# Patient Record
Sex: FEMALE | Race: WHITE | NOT HISPANIC OR LATINO | Employment: OTHER | URBAN - METROPOLITAN AREA
[De-identification: names, ages, dates, MRNs, and addresses within clinical notes are randomized per-mention and may not be internally consistent; named-entity substitution may affect disease eponyms.]

---

## 2017-02-20 ENCOUNTER — OFFICE VISIT (OUTPATIENT)
Dept: NEUROLOGY | Facility: CLINIC | Age: 82
End: 2017-02-20

## 2017-02-20 VITALS — HEART RATE: 127 BPM | RESPIRATION RATE: 18 BRPM | DIASTOLIC BLOOD PRESSURE: 113 MMHG | SYSTOLIC BLOOD PRESSURE: 181 MMHG

## 2017-02-20 DIAGNOSIS — G20.A1 PARALYSIS AGITANS (H): ICD-10-CM

## 2017-02-20 RX ORDER — ROPINIROLE 0.25 MG/1
0.5 TABLET, FILM COATED ORAL 3 TIMES DAILY
Qty: 180 TABLET | Refills: 3 | Status: SHIPPED | OUTPATIENT
Start: 2017-02-20 | End: 2020-01-27

## 2017-02-20 ASSESSMENT — PAIN SCALES - GENERAL: PAINLEVEL: NO PAIN (0)

## 2017-02-20 NOTE — NURSING NOTE
Chief Complaint   Patient presents with     RECHECK     3 mo f/u pksons med check     Odessa Angulo,ADELAIDE

## 2017-02-20 NOTE — LETTER
2017       RE: Kiara Pacheco  [address]    [place] [address]     Dear Colleague,    Thank you for referring your patient, Kiara Pacheco, to the [facility]UR[facility] at [facility]. Please see a copy of my visit note below.    2017      Ron Joya MD   [facility]    [address] [address]      RE: Kiara Pacheco   MRN: [id number]   : 1934      Dear Colby:      I am writing to you in followup on Kiara Pacheco with chief complaint of Parkinson disease.  I last saw her 3 months ago.  She is here with her niece, rIma.  She is on ropinirole 0.25 mg 2 tablets twice a day.  She thinks it does help.  However, the patient has experienced a decline in her status.  She has more difficulty getting out of a chair and getting out of bed is also very difficult.  Therefore, she is pretty much sleeping in a chair.  She finds it acceptable, but Irma is worried about that.  The patient does take care of her morning cleaning up by herself.  A nurse comes in weekly to help her set up the medicines.  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      On exam, her blood pressure is 181/113.  She has obvious rest tremor in the arms.  She could get out of the chair without my assistance.  She walks 15 feet down the tovar in 20 seconds.  Her gait is extremely slow and she does use a walker.      ASSESSMENT:   1.  Parkinson disease.      DISCUSSION:  The patient is seen in follow-up with Parkinson disease.  There has been no major change in her neurologic status, but she has been experiencing a slow decline over the last year.  I am going to try bumping the ropinirole to 0.25 mg 2 tablets 3 times a day.  I will see her in followup in 3 months or sooner if there are problems.  Irma  knows to call.      Sincerely,       Guille Powers MD              D: 2017 16:28   T: 2017 08:03   MT: AKA      Name:     MINAL GARVIN   MRN:      [id number]        Account:      [id number]   :      1934           Service Date: 2017      Document: [id number]

## 2017-02-20 NOTE — MR AVS SNAPSHOT
After Visit Summary   2017    Kiara Pacheco    MRN: [id number]           Patient Information     Date Of Birth          3/8/1934        Visit Information        Provider Department      2017 4:00 PM Guille Powres MD [facility]        Today's Diagnoses     Paralysis agitans (H)           Follow-ups after your visit        Follow-up notes from your care team     Return in about 3 months (around 2017).      Your next 10 appointments already scheduled     May 22, 2017  4:00 PM CDT   Return Visit with Guille Powers MD   [facility] ([facility])    [address], [address]  [place] [address] [phone]              Who to contact     Please call your clinic at [phone] to:    Ask questions about your health    Make or cancel appointments    Discuss your medicines    Learn about your test results    Speak to your doctor   If you have compliments or concerns about an experience at your clinic, or if you wish to file a complaint, please contact [facility] Patient Relations at [phone] or email us at [email or web address]         Additional Information About Your Visit        MyChart Information     OneShiftt is an electronic gateway that provides easy, online access to your medical records. With Tubis, you can request a clinic appointment, read your test results, renew a prescription or communicate with your care team.     To sign up for OneShiftt visit the website at [email or web address]   You will be asked to enter the access code listed below, as well as some personal information. Please follow the directions to create your username and password.     Your access code is: [email or web address]  Expires: 2017  4:27 PM     Your access code will  in 90 days. If you need help or a new code, please contact  your [facility] or call [phone] for assistance.        Care EveryWhere ID     This is your Care EveryWhere ID. This could be used by other organizations to access your [place] medical records  [id number]        Your Vitals Were     Pulse Respirations                127 18           Blood Pressure from Last 3 Encounters:   02/20/17 (!) 181/113   11/21/16 186/88   08/15/16 140/90    Weight from Last 3 Encounters:   04/13/15 142 lb 3.2 oz (64.5 kg)   01/12/15 132 lb 4.4 oz (60 kg)              Today, you had the following     No orders found for display         Today's Medication Changes          These changes are accurate as of: 2/20/17  4:29 PM.  If you have any questions, ask your nurse or doctor.               These medicines have changed or have updated prescriptions.        Dose/Directions    rOPINIRole 0.25 MG tablet   Commonly known as:  REQUIP   This may have changed:    - how much to take  - when to take this   Used for:  Paralysis agitans (H)   Changed by:  Guille Powers MD        Dose:  0.5 mg   Take 2 tablets (0.5 mg) by mouth 3 times daily   Quantity:  180 tablet   Refills:  3            Where to get your medicines      These medications were sent to [facility] #[id number] [facility] [address]  [address] [facility] [address]     Phone:  [phone]     rOPINIRole 0.25 MG tablet                Primary Care Provider Office Phone # Fax #    Velasquez Tiffanio [phone]       [address] [address]        Thank you!     Thank you for choosing [facility]  for your care. Our goal is always to provide you with excellent care. Hearing back from our patients is one way we can continue to improve our services. Please take a few minutes to complete the written survey that you may receive in the mail after your visit with us. Thank you!             Your  Updated Medication List - Protect others around you: Learn how to safely use, store and throw away your medicines at www.disposemymeds.org.          This list is accurate as of: 2/20/17  4:29 PM.  Always use your most recent med list.                   Brand Name Dispense Instructions for use    acetaminophen 500 MG tablet    TYLENOL     Take 2 tablets by mouth every 6 hours as needed       ALENDRONATE SODIUM PO      Take 40 mg by mouth once a week       aspirin 81 MG tablet      Take 1 tablet by mouth daily       atenolol 50 MG tablet    TENORMIN     Take 1 tablet by mouth 2 times daily       calcium 600 + D 600-400 MG-UNIT per tablet   Generic drug:  calcium-vitamin D      Take 1 tablet by mouth 2 times daily       carbidopa-levodopa  MG per tablet    SINEMET    135 tablet    Take 1.5 tablets by mouth 3 times daily       CRESTOR 10 MG tablet   Generic drug:  rosuvastatin      Take 1 tablet by mouth At Bedtime       docusate sodium 100 MG capsule    COLACE     Take 1 capsule by mouth daily       furosemide 40 MG tablet    LASIX     Take 1 tablet by mouth daily.       IMDUR 30 MG 24 hr tablet   Generic drug:  isosorbide mononitrate      Take 60 mg by mouth 2 times daily       lisinopril 40 MG tablet    PRINIVIL/ZESTRIL     Take 20 mg by mouth 2 times daily       NIFEDIPINE PO      Take 60 mg by mouth daily       NITROSTAT 0.4 MG sublingual tablet   Generic drug:  nitroglycerin      Place 1 tablet under the tongue every 5 minutes as needed       rOPINIRole 0.25 MG tablet    REQUIP    180 tablet    Take 2 tablets (0.5 mg) by mouth 3 times daily       SENNA-S 8.6-50 MG per tablet   Generic drug:  senna-docusate      Take 1 tablet by mouth 2 times daily       ZANTAC 150 MG tablet   Generic drug:  ranitidine      Take 1 tablet by mouth 3 times daily One tab in AM, 0.5 tab midday, one pill in PM.

## 2017-02-21 NOTE — PROGRESS NOTES
2017      Ron Joya MD   [address] [address]      RE: Minal Pacheco   MRN: [id number]   : 1934      Dear Colby:      I am writing to you in followup on Minal Pacheco with chief complaint of Parkinson disease.  I last saw her 3 months ago.  She is here with her niece, Irma.  She is on ropinirole 0.25 mg 2 tablets twice a day.  She thinks it does help.  However, the patient has experienced a decline in her status.  She has more difficulty getting out of a chair and getting out of bed is also very difficult.  Therefore, she is pretty much sleeping in a chair.  She finds it acceptable, but Irma is worried about that.  The patient does take care of her morning cleaning up by herself.  A nurse comes in weekly to help her set up the medicines.  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      On exam, her blood pressure is 181/113.  She has obvious rest tremor in the arms.  She could get out of the chair without my assistance.  She walks 15 feet down the tovar in 20 seconds.  Her gait is extremely slow and she does use a walker.      ASSESSMENT:   1.  Parkinson disease.      DISCUSSION:  The patient is seen in follow-up with Parkinson disease.  There has been no major change in her neurologic status, but she has been experiencing a slow decline over the last year.  I am going to try bumping the ropinirole to 0.25 mg 2 tablets 3 times a day.  I will see her in followup in 3 months or sooner if there are problems.  Irma knows to call.      Sincerely,       MD CARLOS Epps MD             D: 2017 16:28   T: 2017 08:03   MT: AKA      Name:     MINAL PACHECO   MRN:      [id number]        Account:      [id number]   :      1934           Service Date: 2017      Document: [id number]

## 2017-03-08 DIAGNOSIS — G20.A1 PARALYSIS AGITANS (H): ICD-10-CM

## 2017-03-08 RX ORDER — CARBIDOPA/LEVODOPA 25MG-250MG
1.5 TABLET ORAL 3 TIMES DAILY
Qty: 405 TABLET | Refills: 3 | Status: SHIPPED | OUTPATIENT
Start: 2017-03-08 | End: 2017-05-04

## 2017-05-04 ENCOUNTER — OFFICE VISIT (OUTPATIENT)
Dept: NEUROLOGY | Facility: CLINIC | Age: 82
End: 2017-05-04

## 2017-05-04 VITALS — HEART RATE: 112 BPM | DIASTOLIC BLOOD PRESSURE: 113 MMHG | SYSTOLIC BLOOD PRESSURE: 172 MMHG

## 2017-05-04 DIAGNOSIS — G20.A1 PARALYSIS AGITANS (H): ICD-10-CM

## 2017-05-04 RX ORDER — CARBIDOPA/LEVODOPA 25MG-250MG
1.5 TABLET ORAL SEE ADMIN INSTRUCTIONS
Qty: 450 TABLET | Refills: 3 | Status: SHIPPED | OUTPATIENT
Start: 2017-05-04 | End: 2019-08-19

## 2017-05-04 RX ORDER — ROSUVASTATIN CALCIUM 10 MG/1
10 TABLET, COATED ORAL
COMMUNITY
Start: 2017-04-14

## 2017-05-04 RX ORDER — AMOXICILLIN 250 MG
1 CAPSULE ORAL
COMMUNITY
Start: 2017-04-28

## 2017-05-04 ASSESSMENT — PAIN SCALES - GENERAL: PAINLEVEL: NO PAIN (0)

## 2017-05-04 NOTE — PROGRESS NOTES
INTERVAL HISTORY:   This patient is seen in followup with Parkinson disease.  She is here with her niece, Irma.  I last saw the patient about 2-1/2 months ago.  At that time I had increased her ropinirole to 0.25 mg 2 tablets 3 times a day.  Irma says the patient responded favorably to that increase.  However, in the last couple of weeks she seemed to have declined in her status again.  She had been sleeping in her bed, which was an improvement.  Now she is back to sleeping in her lift chair.  She would like to sleep in her bed.  Her gait has been more fragile and slow.  She also has been running high blood pressure.  When I saw her in February it was 181/113 and today it is 165/110.  She needs help getting out of a chair.  She has obvious tremor involving her arms.  She has masked facies and overall bradykinesia.  She walks 25 feet in 26 seconds and that is with Irma standing in front of her and pulling the walker along.      ASSESSMENT:  Parkinson disease.      DISCUSSION:  This patient is seen in followup with Parkinson disease.  Things are not going all that well.  She looks similar to how she did in February, although Irma insists that the patient was quite improved for a month or 2 after increasing the ropinirole and it is only in the last couple of weeks that she seems to have slipped back to this lower baseline.  She is very interested in trying to increase the Sinemet.  She is on 25/250, 1-1/2 tablets 3 times a day.  I will add an extra half tablet in the morning so she takes a total of 5 tablets a day.  A new prescription was issued to that end.  I will see her in followup in 3 months, but encouraged him to call if there are questions or problems.  I also recommended that she keep track of her blood pressure and discuss it with Dr. Joya at the time of their next visit, which is in the near future.      Guille Powers MD      cc:   Ron Joya MD   [address] [place] [address]         CARLOS GOVEA MD             D: 2017 14:57   T: 2017 15:13   MT: AKA      Name:     MINAL GARVIN   MRN:      [id number]        Account:      [id number]   :      1934           Service Date: 2017      Document: [id number]

## 2017-05-04 NOTE — LETTER
5/4/2017       RE: Kiara Pacheco  [address]    [place] [address]     Dear Colleague,    Thank you for referring your patient, Kiara Pacheco, to the [facility] at [facility]. Please see a copy of my visit note below.    INTERVAL HISTORY:   This patient is seen in followup with Parkinson disease.  She is here with her niece, Irma.  I last saw the patient about 2-1/2 months ago.  At that time I had increased her ropinirole to 0.25 mg 2 tablets 3 times a day.  Irma says the patient responded favorably to that increase.  However, in the last couple of weeks she seemed to have declined in her status again.  She had been sleeping in her bed, which was an improvement.  Now she is back to sleeping in her lift chair.  She would like to sleep in her bed.  Her gait has been more fragile and slow.  She also has been running high blood pressure.  When I saw her in February it was 181/113 and today it is 165/110.  She needs help getting out of a chair.  She has obvious tremor involving her arms.  She has masked facies and overall bradykinesia.  She walks 25 feet in 26 seconds and that is with rIma standing in front of her and pulling the walker along.      ASSESSMENT:  Parkinson disease.      DISCUSSION:  This patient is seen in followup with Parkinson disease.  Things are not going all that well.  She looks similar to how she did in February, although Irma insists that the patient was quite improved for a month or 2 after increasing the ropinirole and it is only in the last couple of weeks that she seems to have slipped back to this lower baseline.  She is very interested in trying to increase the Sinemet.  She is on 25/250, 1-1/2 tablets 3 times a day.  I will add an extra half tablet in the morning so she takes a total of 5 tablets a day.  A new prescription was issued to that end.  I will see her in  followup in 3 months, but encouraged him to call if there are questions or problems.  I also recommended that she keep track of her blood pressure and discuss it with Dr. Joya at the time of their next visit, which is in the near future.      Guille Powers MD      cc:   Ron Joya MD   [address] [address]                 D: 2017 14:57   T: 2017 15:13   MT: AKA      Name:     MINAL GARIVN   MRN:      [id number]        Account:      [id number]   :      1934           Service Date: 2017      Document: [id number]

## 2017-05-04 NOTE — MR AVS SNAPSHOT
After Visit Summary   2017    Kiara Pacheco    MRN: [id number]           Patient Information     Date Of Birth          3/8/1934        Visit Information        Provider Department      2017 2:30 PM Guille Powers MD [facility]        Today's Diagnoses     Paralysis agitans (H)           Follow-ups after your visit        Follow-up notes from your care team     Return in about 3 months (around 2017).      Your next 10 appointments already scheduled     Aug 07, 2017  2:00 PM CDT   Return Visit with Guille Powers MD   [facility] ([facility])    [address], [address]  [place] [address] [phone]              Who to contact     Please call your clinic at [phone] to:    Ask questions about your health    Make or cancel appointments    Discuss your medicines    Learn about your test results    Speak to your doctor   If you have compliments or concerns about an experience at your clinic, or if you wish to file a complaint, please contact [facility] Patient Relations at [phone] or email us at [email or web address]         Additional Information About Your Visit        MyChart Information     ChaChahart is an electronic gateway that provides easy, online access to your medical records. With CareinSync, you can request a clinic appointment, read your test results, renew a prescription or communicate with your care team.     To sign up for [email or web address] visit the website at [email or web address]   You will be asked to enter the access code listed below, as well as some personal information. Please follow the directions to create your username and password.     Your access code is: [email or web address]  Expires: 2017  5:27 PM     Your access code will  in 90 days. If you need help or a new code, please contact your  [facility] or call [phone] for assistance.        Care EveryWhere ID     This is your Care EveryWhere ID. This could be used by other organizations to access your [place] medical records  [id number]        Your Vitals Were     Pulse                   112            Blood Pressure from Last 3 Encounters:   05/04/17 (!) 172/113   02/20/17 (!) 181/113   11/21/16 186/88    Weight from Last 3 Encounters:   04/13/15 142 lb 3.2 oz (64.5 kg)   01/12/15 132 lb 4.4 oz (60 kg)              Today, you had the following     No orders found for display         Today's Medication Changes          These changes are accurate as of: 5/4/17  2:58 PM.  If you have any questions, ask your nurse or doctor.               These medicines have changed or have updated prescriptions.        Dose/Directions    carbidopa-levodopa  MG per tablet   Commonly known as:  SINEMET   This may have changed:    - when to take this  - additional instructions   Used for:  Paralysis agitans (H)   Changed by:  Guille Powers MD        Dose:  1.5 tablet   Take 1.5 tablets by mouth See Admin Instructions Take two in the am, 1.5 at noon, and 1.5 in the evening.   Quantity:  450 tablet   Refills:  3            Where to get your medicines      These medications were sent to [facility] #[id number] - [facility] [address]  [address] [facility] [address]     Phone:  [phone]     carbidopa-levodopa  MG per tablet                Primary Care Provider Office Phone # Fax #    Ron Joya [phone]       [address] [address]        Thank you!     Thank you for choosing [facility]  for your care. Our goal is always to provide you with excellent care. Hearing back from our patients is one way we can continue to improve our services. Please take a few minutes to complete the written survey  that you may receive in the mail after your visit with us. Thank you!             Your Updated Medication List - Protect others around you: Learn how to safely use, store and throw away your medicines at www.disposemymeds.org.          This list is accurate as of: 5/4/17  2:58 PM.  Always use your most recent med list.                   Brand Name Dispense Instructions for use    acetaminophen 500 MG tablet    TYLENOL     Take 2 tablets by mouth every 6 hours as needed       * ALENDRONATE SODIUM PO      Take 40 mg by mouth once a week       * alendronate 40 MG Tabs    FOSAMAX         aspirin 81 MG tablet      Take 1 tablet by mouth daily       atenolol 50 MG tablet    TENORMIN     Take 1 tablet by mouth 2 times daily       calcium 600 + D 600-400 MG-UNIT per tablet   Generic drug:  calcium-vitamin D      Take 1 tablet by mouth 2 times daily       carbidopa-levodopa  MG per tablet    SINEMET    450 tablet    Take 1.5 tablets by mouth See Admin Instructions Take two in the am, 1.5 at noon, and 1.5 in the evening.       * CRESTOR 10 MG tablet   Generic drug:  rosuvastatin      Take 1 tablet by mouth At Bedtime       * rosuvastatin 10 MG tablet    CRESTOR     Take 10 mg by mouth       docusate sodium 100 MG capsule    COLACE     Take 1 capsule by mouth daily       furosemide 40 MG tablet    LASIX     Take 1 tablet by mouth daily.       IMDUR 30 MG 24 hr tablet   Generic drug:  isosorbide mononitrate      Take 60 mg by mouth 2 times daily       lisinopril 40 MG tablet    PRINIVIL/ZESTRIL     Take 20 mg by mouth 2 times daily       NIFEDIPINE PO      Take 60 mg by mouth daily       NITROSTAT 0.4 MG sublingual tablet   Generic drug:  nitroglycerin      Place 1 tablet under the tongue every 5 minutes as needed       rOPINIRole 0.25 MG tablet    REQUIP    180 tablet    Take 2 tablets (0.5 mg) by mouth 3 times daily       * SENNA-S 8.6-50 MG per tablet   Generic drug:  senna-docusate      Take 1 tablet by mouth 2 times  daily       * SENEXON-S 8.6-50 MG per tablet   Generic drug:  senna-docusate      Take 1 tablet by mouth       ZANTAC 150 MG tablet   Generic drug:  ranitidine      Take 1 tablet by mouth 3 times daily One tab in AM, 0.5 tab midday, one pill in PM.       * Notice:  This list has 6 medication(s) that are the same as other medications prescribed for you. Read the directions carefully, and ask your doctor or other care provider to review them with you.

## 2017-06-23 ENCOUNTER — TELEPHONE (OUTPATIENT)
Dept: NEUROLOGY | Facility: CLINIC | Age: 82
End: 2017-06-23

## 2017-06-23 NOTE — TELEPHONE ENCOUNTER
Home health nurse called. B/p on visit today was low at 85 systolic.  She is off anti-htn meds.  Low bp could be due to parkinson drugs.  I have little to suggest.  Recommend check orthostatic b/p, if it lower, then she may need to go to hospital.  Encourage fluids and salt.

## 2017-07-07 DIAGNOSIS — I45.6 ANOMALOUS ATRIOVENTRICULAR EXCITATION: Primary | ICD-10-CM

## 2017-07-07 RX ORDER — CARBIDOPA/LEVODOPA 25MG-250MG
1 TABLET ORAL 3 TIMES DAILY
Qty: 90 TABLET | Refills: 1 | Status: SHIPPED | OUTPATIENT
Start: 2017-07-07 | End: 2019-08-19

## 2019-08-19 ENCOUNTER — OFFICE VISIT (OUTPATIENT)
Dept: NEUROLOGY | Facility: CLINIC | Age: 84
End: 2019-08-19
Payer: COMMERCIAL

## 2019-08-19 VITALS
WEIGHT: 135.8 LBS | HEART RATE: 75 BPM | RESPIRATION RATE: 16 BRPM | SYSTOLIC BLOOD PRESSURE: 110 MMHG | BODY MASS INDEX: 28.86 KG/M2 | DIASTOLIC BLOOD PRESSURE: 82 MMHG

## 2019-08-19 DIAGNOSIS — I45.6 ANOMALOUS ATRIOVENTRICULAR EXCITATION: ICD-10-CM

## 2019-08-19 RX ORDER — DONEPEZIL HYDROCHLORIDE 10 MG/1
10 TABLET, FILM COATED ORAL
COMMUNITY
Start: 2019-05-01 | End: 2023-01-01

## 2019-08-19 RX ORDER — CARBIDOPA/LEVODOPA 25MG-250MG
1 TABLET ORAL SEE ADMIN INSTRUCTIONS
Qty: 120 TABLET | Refills: 6 | Status: SHIPPED | OUTPATIENT
Start: 2019-08-19 | End: 2020-01-27

## 2019-08-19 RX ORDER — ARIPIPRAZOLE 5 MG/1
2.5 TABLET ORAL
COMMUNITY
Start: 2019-08-05

## 2019-08-19 ASSESSMENT — PAIN SCALES - GENERAL: PAINLEVEL: NO PAIN (0)

## 2019-08-19 NOTE — LETTER
RE: Kiara Pacheco  [address]  [place] [address]     Dear Colleague,    Thank you for referring your patient, Kiara Pacheco, to the [facility] NEUROSPECIALTIES at [facility]. Please see a copy of my visit note below.    Service Date: 08/19/2019      INTERVAL HISTORY:  This patient is seen in followup with Parkinson disease.  She is here with her niece, Jamila, and the , Kimani.  I have not seen the patient in over 2 years.  She was hospitalized in 05/2017 with a fall.  She had a prolonged convalescence.  She now lives with her niece and the family.  There are young children at home and this has had a healthy effect on the patient's attitude and outlook.  She is no longer able to live alone.  They do describe increased tremor and stiffness.  She has not been fainting.  She keeps quite active.  She walks outdoors with assistance.  She swims in the pool.  She has not had problems with her blood pressure.  She is on Sinemet 25/250 three times a day at 10:00 a.m., 4:00 p.m., and 10:00 p.m.  She also is on aripiprazole because she has been having hallucinations of voices.  The aripiprazole has helped eliminate this symptom.  She is treated for high blood pressure.      PHYSICAL EXAMINATION:   GENERAL:  The patient is cooperative and in no distress.   VITAL SIGNS:  Her blood pressure is 126/110.   NEUROLOGIC:  She can get out of a chair without assistance, but uses her arms to push off.  She walks with one person assistance.  She goes down the hallway.  She takes shuffling type steps but they are not necessarily short in length.  She does not swing her arms.  She makes about 4 or 5-step turn.  There is no rigidity at the arms noted.  She does have masked facies.      ASSESSMENT:  Parkinson disease.      DISCUSSION:  This patient is seen with Parkinson disease.  She also has had issues with auditory hallucinations and cognitive impairment.  In  "reviewing her medication list in addition to the Sinemet, she is on donepezil and aripiprazole.  Jamila indicates that she thinks the patient probably should be on a somewhat higher dose of Sinemet.  She was on a higher dose of Sinemet in 2017, 5 tablets a day plus the ropinirole.  Her current dose of ropinirole is 0.25 mg 2 tablets 3 times daily.  I am going to give her a new prescription for Sinemet 25/250, 1-1/2 tablets at 10:00 a.m. and again at 4:00 p.m. and 1 tablet at 10:00 p.m.  I will not make any other changes.  The niece was wondering about the medicine \"Preductal and Meldonium.\"  I think these are medications that are only available in [place].  I do not have any information about these drugs and cannot give her a prescription for them.  I will see the patient in followup in 3 months.  They know to call if there are questions or problems.      This was a 25-minute appointment, more than half of which was spent in counseling and coordination of care.      Guille Powers MD      cc:   Ron Joya MD   [address] [address]      D: 2019   T: 2019   MT: AKA    Name:     MINAL GARVIN   MRN:      [id number]        Account:      [id number]   :      1934           Service Date: 2019    Document: [id number]     "

## 2019-08-19 NOTE — PROGRESS NOTES
Service Date: 08/19/2019      INTERVAL HISTORY:  This patient is seen in followup with Parkinson disease.  She is here with her niece, Jamila, and the , Kimani.  I have not seen the patient in over 2 years.  She was hospitalized in 05/2017 with a fall.  She had a prolonged convalescence.  She now lives with her niece and the family.  There are young children at home and this has had a healthy effect on the patient's attitude and outlook.  She is no longer able to live alone.  They do describe increased tremor and stiffness.  She has not been fainting.  She keeps quite active.  She walks outdoors with assistance.  She swims in the pool.  She has not had problems with her blood pressure.  She is on Sinemet 25/250 three times a day at 10:00 a.m., 4:00 p.m., and 10:00 p.m.  She also is on aripiprazole because she has been having hallucinations of voices.  The aripiprazole has helped eliminate this symptom.  She is treated for high blood pressure.      PHYSICAL EXAMINATION:   GENERAL:  The patient is cooperative and in no distress.   VITAL SIGNS:  Her blood pressure is 126/110.   NEUROLOGIC:  She can get out of a chair without assistance, but uses her arms to push off.  She walks with one person assistance.  She goes down the hallway.  She takes shuffling type steps but they are not necessarily short in length.  She does not swing her arms.  She makes about 4 or 5-step turn.  There is no rigidity at the arms noted.  She does have masked facies.      ASSESSMENT:  Parkinson disease.      DISCUSSION:  This patient is seen with Parkinson disease.  She also has had issues with auditory hallucinations and cognitive impairment.  In reviewing her medication list in addition to the Sinemet, she is on donepezil and aripiprazole.  Jamila indicates that she thinks the patient probably should be on a somewhat higher dose of Sinemet.  She was on a higher dose of Sinemet in 05/2017, 5 tablets a day plus the ropinirole.  Her  "current dose of ropinirole is 0.25 mg 2 tablets 3 times daily.  I am going to give her a new prescription for Sinemet 25/250, 1-1/2 tablets at 10:00 a.m. and again at 4:00 p.m. and 1 tablet at 10:00 p.m.  I will not make any other changes.  The niece was wondering about the medicine \"Preductal and Meldonium.\"  I think these are medications that are only available in [place].  I do not have any information about these drugs and cannot give her a prescription for them.  I will see the patient in followup in 3 months.  They know to call if there are questions or problems.      This was a 25-minute appointment, more than half of which was spent in counseling and coordination of care.      Carlos Powers MD      cc:   Ron Joya MD   [address] [address]         CARLOS POWERS MD             D: 2019   T: 2019   MT: AKA      Name:     MINAL GARVIN   MRN:      [id number]        Account:      [id number]   :      1934           Service Date: 2019      Document: [id number]    "

## 2019-08-20 ENCOUNTER — TELEPHONE (OUTPATIENT)
Dept: NEUROLOGY | Facility: CLINIC | Age: 84
End: 2019-08-20

## 2019-08-20 NOTE — TELEPHONE ENCOUNTER
What is the concern that needs to be addressed by a nurse? Jennifer (nurse) would like a clarification on her medication.     May a detailed message be left on voicemail? Yes [phone]    Date of last office visit: 8/19/19    Message routed to: nurse

## 2020-01-27 ENCOUNTER — OFFICE VISIT (OUTPATIENT)
Dept: NEUROLOGY | Facility: CLINIC | Age: 85
End: 2020-01-27
Payer: COMMERCIAL

## 2020-01-27 VITALS
SYSTOLIC BLOOD PRESSURE: 112 MMHG | BODY MASS INDEX: 28.69 KG/M2 | WEIGHT: 135 LBS | HEART RATE: 73 BPM | DIASTOLIC BLOOD PRESSURE: 75 MMHG

## 2020-01-27 DIAGNOSIS — G20.A1 PARALYSIS AGITANS (H): ICD-10-CM

## 2020-01-27 DIAGNOSIS — I45.6 ANOMALOUS ATRIOVENTRICULAR EXCITATION: ICD-10-CM

## 2020-01-27 RX ORDER — ROPINIROLE 0.25 MG/1
0.5 TABLET, FILM COATED ORAL 3 TIMES DAILY
Qty: 180 TABLET | Refills: 6 | Status: SHIPPED | OUTPATIENT
Start: 2020-01-27 | End: 2020-10-21

## 2020-01-27 RX ORDER — CARBIDOPA/LEVODOPA 25MG-250MG
1 TABLET ORAL SEE ADMIN INSTRUCTIONS
Qty: 120 TABLET | Refills: 6 | Status: SHIPPED | OUTPATIENT
Start: 2020-01-27 | End: 2020-07-14

## 2020-01-27 ASSESSMENT — PAIN SCALES - GENERAL: PAINLEVEL: NO PAIN (0)

## 2020-01-27 NOTE — LETTER
RE: Kiara Pacheco  [address]  [place] [address]     Dear Colleague,    Thank you for referring your patient, Kiara Pacheco, to the [facility] NEUROSPECIALTIES at [facility]. Please see a copy of my visit note below.    Service Date: 01/27/2020      INTERVAL HISTORY:  This patient is seen in followup with Parkinson disease.  She is here with her niece, Jamila, and the , Pat.  I last saw the patient about 5 months ago.  There has been no major change in her neurologic status.  She does have tremor later in the day.  The patient is on Sinemet 25/100, 1-1/2 tablets at 8:00 a.m., 1-1/2 tablets at 4:00 p.m., and 1 tablet at 10:00 p.m.  This regimen works fairly well.  In addition, she takes ropinirole 0.25 mg.  The prescription is written for 2 tablets 3 times a day, but she is only taking 1 tablet 3 times a day.  The niece, Jamila, has added a second tablet to the evening dose and that seems to help the tremor that comes on later in the day.      The patient lives with her niece and the niece's  and 2 small children.  They are in a condominium without stairs, but are moving into a house.  There will be a few stairs in the house.  The patient has 24-hour supervision from her family.  She has no complaints.      PHYSICAL EXAMINATION:   GENERAL:  The patient is cooperative and in no distress.   VITAL SIGNS:  Her blood pressure is 112/75.     NEUROLOGIC:   She gets out of a chair by pushing off with her arms.  She easily walks down the tovar.  She goes 25 feet in about 20 or more seconds but is stable on her feet.  She has not been falling.  She does have some arm swing.  She makes about a 4-step turn.  I did not appreciate any tremor.  She is alert.      ASSESSMENT:   1.  Parkinson disease.      DISCUSSION:  This patient is seen in followup with Parkinson disease.  Neurologically, she seems fairly stable.  She is on Sinemet and ropinirole.   I am going to refill both of these medicines.  I told the niece that it was fine to give her an extra dose of ropinirole later in the day if that helps the tremor.  I have no other recommendations at this time.  I will see her in followup in 5 months.      Guille Powers MD      cc:   Ron Joya MD   [address] [address]       D: 2020   T: 2020   MT: JULIANNE    Name:     MINAL GARVIN   MRN:      [id number]        Account:      [id number]   :      1934           Service Date: 2020    Document: [id number]

## 2020-01-28 NOTE — PROGRESS NOTES
Service Date: 01/27/2020      INTERVAL HISTORY:  This patient is seen in followup with Parkinson disease.  She is here with her niece, Jmaila, and the , Pat.  I last saw the patient about 5 months ago.  There has been no major change in her neurologic status.  She does have tremor later in the day.  The patient is on Sinemet 25/100, 1-1/2 tablets at 8:00 a.m., 1-1/2 tablets at 4:00 p.m., and 1 tablet at 10:00 p.m.  This regimen works fairly well.  In addition, she takes ropinirole 0.25 mg.  The prescription is written for 2 tablets 3 times a day, but she is only taking 1 tablet 3 times a day.  The niece, Jamila, has added a second tablet to the evening dose and that seems to help the tremor that comes on later in the day.      The patient lives with her niece and the niece's  and 2 small children.  They are in a condominium without stairs, but are moving into a house.  There will be a few stairs in the house.  The patient has 24-hour supervision from her family.  She has no complaints.      PHYSICAL EXAMINATION:   GENERAL:  The patient is cooperative and in no distress.   VITAL SIGNS:  Her blood pressure is 112/75.     NEUROLOGIC:   She gets out of a chair by pushing off with her arms.  She easily walks down the tovar.  She goes 25 feet in about 20 or more seconds but is stable on her feet.  She has not been falling.  She does have some arm swing.  She makes about a 4-step turn.  I did not appreciate any tremor.  She is alert.      ASSESSMENT:   1.  Parkinson disease.      DISCUSSION:  This patient is seen in followup with Parkinson disease.  Neurologically, she seems fairly stable.  She is on Sinemet and ropinirole.  I am going to refill both of these medicines.  I told the niece that it was fine to give her an extra dose of ropinirole later in the day if that helps the tremor.  I have no other recommendations at this time.  I will see her in followup in 5 months.      Guille Powers MD      cc:    Ron Joya MD   [address] [address]         CARLOS GOVEA MD             D: 2020   T: 2020   MT: JULIANNE      Name:     MINAL GARVIN   MRN:      [id number]        Account:      [id number]   :      1934           Service Date: 2020      Document: [id number]

## 2020-06-12 ENCOUNTER — TRANSFERRED RECORDS (OUTPATIENT)
Dept: HEALTH INFORMATION MANAGEMENT | Facility: CLINIC | Age: 85
End: 2020-06-12

## 2020-06-19 ENCOUNTER — TRANSFERRED RECORDS (OUTPATIENT)
Dept: HEALTH INFORMATION MANAGEMENT | Facility: CLINIC | Age: 85
End: 2020-06-19

## 2020-06-20 ENCOUNTER — TRANSFERRED RECORDS (OUTPATIENT)
Dept: HEALTH INFORMATION MANAGEMENT | Facility: CLINIC | Age: 85
End: 2020-06-20

## 2020-07-07 ENCOUNTER — OFFICE VISIT (OUTPATIENT)
Dept: NEUROLOGY | Facility: CLINIC | Age: 85
End: 2020-07-07
Payer: COMMERCIAL

## 2020-07-07 VITALS — OXYGEN SATURATION: 99 %

## 2020-07-07 DIAGNOSIS — R40.4 EPISODE OF UNRESPONSIVENESS: Primary | ICD-10-CM

## 2020-07-07 PROCEDURE — 99214 OFFICE O/P EST MOD 30 MIN: CPT | Performed by: PSYCHIATRY & NEUROLOGY

## 2020-07-07 NOTE — PROGRESS NOTES
Visit Date:   07/07/2020      This patient is seen in followup with her niece, Jamila.  She is seen for episodes of unresponsiveness.  Blanca, the , is available by video.  I last saw the patient in January for Parkinson's disease.  More recently, she has had episodes of unresponsiveness.  Jamila has observed 3 altogether.  One occurred when the patient was having a stress test and her blood pressure went up.  She had another episode at the meal table and then went to the toilet and was sitting on the toilet and was unresponsive.  On the third event, the patient was in the car and was unresponsive while Jamila was driving.  It is difficult to get a history from the patient in terms of what symptoms might precede the event.      PHYSICAL EXAMINATION:   GENERAL:  The patient is cooperative.   VITAL SIGNS:  Her blood pressure is 112/75.   NEUROLOGIC:  She is alert.  She follows commands through the .  She does not offer much in the way of history.  Visual fields are full to confrontation, though there may be some impairment to the right.  Her niece says that vision in her right eye is poor.  Eye movements appear complete and conjugate.  Face moves symmetrically.  She moves her arms and legs equally well.  She can perform kinetic motor tests without ataxia.  Reflexes are absent in the arms and ankles and trace at the knees.  Toes are downgoing.  She is able to get out of her chair and ambulate a few steps, although she is quite unsteady.  She has obvious rest tremor, which is part of her Parkinson's disease.      Jamila showed me a video of the episode that occurred on the toilet.  The patient appears unresponsive.  She is not shaking.  At one point, she seemed to become a little more responsive, with her eyes were open, and she was looking to the right.  She did not appear to interact.      She did go to [facility].  She had an MRI scan of the brain performed.  I have reviewed the report.  There was  no acute abnormality.      The patient does have a more recent diagnosis of breast cancer and has had surgery concerning that.      ASSESSMENT:   1.  Recent episodes of unresponsiveness.   2.  Longstanding Parkinson's disease.   3.  Recent surgery for breast cancer.      DISCUSSION:  The patient is seen with the above problem list.  At this point, the main issue has to do with the episodes of unresponsiveness.  These could represent complex partial seizures.  I am going to get a 3-hour video EEG in that regard.  I did discuss whether or not to initiate treatment with anticonvulsants.  After reviewing the pros and cons, it was decided to hold off on any medical treatment for now.  The patient is quite fragile and a new medication might have side effects.  She will be following up in the next few weeks for reevaluation.      This was a 25-minute appointment, more than half of which was spent in counseling and coordination of care.         CARLOS GOVEA MD             D: 2020   T: 2020   MT: LEN      Name:     MINAL GARVIN   MRN:      [id number]        Account:      [id number]   :      1934           Visit Date:   2020      Document: [id number]

## 2020-07-07 NOTE — LETTER
7/7/2020         RE: Kiara Pacheco  [address]  [place] [address]        Dear Colleague,    Thank you for referring your patient, Kiara Pacheco, to the [facility]. Please see a copy of my visit note below.    Visit Date:   07/07/2020      This patient is seen in followup with her niece, Jamila.  She is seen for episodes of unresponsiveness.  Blanca, the , is available by video.  I last saw the patient in January for Parkinson's disease.  More recently, she has had episodes of unresponsiveness.  Jamila has observed 3 altogether.  One occurred when the patient was having a stress test and her blood pressure went up.  She had another episode at the meal table and then went to the toilet and was sitting on the toilet and was unresponsive.  On the third event, the patient was in the car and was unresponsive while Jamila was driving.  It is difficult to get a history from the patient in terms of what symptoms might precede the event.      PHYSICAL EXAMINATION:   GENERAL:  The patient is cooperative.   VITAL SIGNS:  Her blood pressure is 112/75.   NEUROLOGIC:  She is alert.  She follows commands through the .  She does not offer much in the way of history.  Visual fields are full to confrontation, though there may be some impairment to the right.  Her niece says that vision in her right eye is poor.  Eye movements appear complete and conjugate.  Face moves symmetrically.  She moves her arms and legs equally well.  She can perform kinetic motor tests without ataxia.  Reflexes are absent in the arms and ankles and trace at the knees.  Toes are downgoing.  She is able to get out of her chair and ambulate a few steps, although she is quite unsteady.  She has obvious rest tremor, which is part of her Parkinson's disease.      Jamila showed me a video of the episode that occurred on the toilet.  The patient appears unresponsive.  She is not shaking.  At one point, she  seemed to become a little more responsive, with her eyes were open, and she was looking to the right.  She did not appear to interact.      She did go to [facility].  She had an MRI scan of the brain performed.  I have reviewed the report.  There was no acute abnormality.      The patient does have a more recent diagnosis of breast cancer and has had surgery concerning that.      ASSESSMENT:   1.  Recent episodes of unresponsiveness.   2.  Longstanding Parkinson's disease.   3.  Recent surgery for breast cancer.      DISCUSSION:  The patient is seen with the above problem list.  At this point, the main issue has to do with the episodes of unresponsiveness.  These could represent complex partial seizures.  I am going to get a 3-hour video EEG in that regard.  I did discuss whether or not to initiate treatment with anticonvulsants.  After reviewing the pros and cons, it was decided to hold off on any medical treatment for now.  The patient is quite fragile and a new medication might have side effects.  She will be following up in the next few weeks for reevaluation.      This was a 25-minute appointment, more than half of which was spent in counseling and coordination of care.         CARLOS POWERS MD             D: 2020   T: 2020   MT: LEN      Name:     MINAL GARVIN   MRN:      [id number]        Account:      [id number]   :      1934           Visit Date:   2020      Document: [id number]        Again, thank you for allowing me to participate in the care of your patient.        Sincerely,        Carlos Powers MD

## 2020-07-07 NOTE — NURSING NOTE
Kiara Pacheco's goals for this visit include: return  She requests these members of her care team be copied on today's visit information:     PCP: Ron Joya    Referring Provider:  Referred Self, MD  No address on file    SpO2 99%     Do you need any medication refills at today's visit? ?

## 2020-07-08 ENCOUNTER — ANCILLARY PROCEDURE (OUTPATIENT)
Dept: NEUROLOGY | Facility: CLINIC | Age: 85
End: 2020-07-08
Attending: PSYCHIATRY & NEUROLOGY
Payer: COMMERCIAL

## 2020-07-08 DIAGNOSIS — R40.4 EPISODE OF UNRESPONSIVENESS: ICD-10-CM

## 2020-07-13 ENCOUNTER — TELEPHONE (OUTPATIENT)
Dept: NEUROLOGY | Facility: CLINIC | Age: 85
End: 2020-07-13

## 2020-07-13 DIAGNOSIS — I45.6 ANOMALOUS ATRIOVENTRICULAR EXCITATION: ICD-10-CM

## 2020-07-13 DIAGNOSIS — R68.89 SPELLS OF DECREASED ATTENTIVENESS: Primary | ICD-10-CM

## 2020-07-13 NOTE — TELEPHONE ENCOUNTER
What is the concern that needs to be addressed by a nurse? Pt's  would like a call back to discuss a possible medication increase.     May a detailed message be left on voicemail? Yes     Date of last office visit: 7/7/20    Message routed to: Neurology RN Pool & Dr. Powers

## 2020-07-14 RX ORDER — CARBIDOPA/LEVODOPA 25MG-250MG
1 TABLET ORAL SEE ADMIN INSTRUCTIONS
Qty: 150 TABLET | Refills: 6 | Status: SHIPPED | OUTPATIENT
Start: 2020-07-14 | End: 2020-12-30

## 2020-07-14 NOTE — TELEPHONE ENCOUNTER
Discussed with Irma.  EEG shows slowing, non-epileptiform.  Will put in referral to epilepsy as episodes could represent complex partial seizures.  Daughter-in-law desires small increase in sinemet.  Put in for 25/250, two in am, two at 4 pm, one at 10 pm.  Watch out for side effects.

## 2020-07-28 ENCOUNTER — TELEPHONE (OUTPATIENT)
Dept: NEUROLOGY | Facility: CLINIC | Age: 85
End: 2020-07-28

## 2020-07-28 ENCOUNTER — VIRTUAL VISIT (OUTPATIENT)
Dept: NEUROLOGY | Facility: CLINIC | Age: 85
End: 2020-07-28
Attending: PSYCHIATRY & NEUROLOGY
Payer: COMMERCIAL

## 2020-07-28 DIAGNOSIS — R55 SYNCOPE, UNSPECIFIED SYNCOPE TYPE: Primary | ICD-10-CM

## 2020-07-28 NOTE — TELEPHONE ENCOUNTER
I called pt Tone Oshea on behalf of Dr. Mccabe. I left a voicemail that Dr. Mccabe wanted to update Dorie that he had asked dr story cardiologist to see pt again before admission for syncope work up. Dr. Story agreed to see pt.     Shefali BUTCHER

## 2020-07-28 NOTE — PROGRESS NOTES
Service Date: 2020      Ron Joya MD   [facility]    [address] [address]      RE: Kiara Pacheco    MRN: [id number]   : 1934      Dear Dr. Joya:       This patient, Mrs. Pacheco, is 86 years old, and she had been evaluated by my associate Guille Powers.  This is a followup phone call on the previous assessment.  The history is difficult to obtain.  We did go through an .  The history from her niece, Jamila, is that she would drop her blood pressure, and she would pass out and be unresponsive.  No convulsive activity is described.  This is fairly much what Dr. Powers has described in his notes.  Dr. Powers had seen the patient in January for Parkinson disease.  This episode of unresponsiveness has been witnessed 3 times.  She had one with a stress test, and her blood pressure went down, an episode at the meal table and the toilet and one episode in the car where she was unresponsive while she was driving.  There have been no premonitory symptoms.  She had a neurological evaluation in person then, and there were problems with vision in her right eye.  Movements were conjugate.  Reflexes were absent in the ankles and trace in the knees.  Toes were downgoing.  She has a history of tremor, which is part of the Parkinson.  A video of an episode was witnessed by Dr. Powers, and she just appeared unresponsive without any shaking.  She has been seen at [facility].  An MRI scan of the brain was normal.  They need things that should be recording from inside her brain, as she thinks she had a seizure.  She is requesting that anticonvulsants be given.  She has a history of breast cancer and had surgery.  The patient has a long-standing history of Parkinson disease.  An EEG was obtained over a period of 2 hours after Dr. Powers saw her, and there was only mild slowing, but no seizure activity.  No medical treatment was offered, and the side effect  profile was for possible seizure.  This patient was then referred to us in the Epilepsy Service.  Talking to Kiara today was difficult.  Apparently, she has no warning.  She has no history of epilepsy.  No history of stroke or any other neurological problem except the Parkinson.        We discussed the EEG findings with her.  She wants some sort of intracranial electrodes placed for it.  I told her we could not make a diagnosis of epilepsy at this time.  She has had a cardiac evaluation, which appears the most likely cause of her syncope.  She has seen Dr. Story in Cardiology, and a copy of this will go to him.  She had a workup prior to surgery, I believe, possibly for the breast cancer.  The note from Dr. Story indicates that the preoperative evaluation was performed in 2020, and it was recommended she have a pharmacological nuclear stress test, and she had that done.  She will have a right breast biopsy under local anesthesia.  She had a questionable history of cardiac disease and that she emigrated approximately 10 years ago from [place].  She had an echocardiogram.  The echocardiogram showed normal left ventricular size.  There was no significant valvular abnormality.  Dilated right ventricle, but normal systolic function.  She is relatively inactive.  No further cardiac evaluation is required prior to going to surgery.  No mention of these episodes.  I noted she has a history of dementia, and she is unable to answer any questions.  She has a history of hypertension, hyperlipidemia and coronary artery disease.  The patient did not have those episodes at that time, and they started subsequent to the cardiac evaluation.  The cardiac evaluation needs to be repeated in view of these new episodes.  Dr. Story, can you please reevaluate this patient for these new episodes of what appear to be syncope.        We might need to make arrangements to admit her to the hospital once I review the video EEG the niece is  going to send to us.  I think that video monitoring will be low yield, and further cardiac evaluation should be the first priority.  We will call Dr. Story, and we informed him of that.      Sincerely,      Christopher Beaver MD  I spoke with dr Story who will see her. wondering about orthotstatic hypotension. Asked nurse at home to check it out. bam      cc:   Derian Story MD   [facility]   [address], [address] [place] [address]         CHRISTOPHER BEAVER MD             D: 2020   T: 2020   MT: darrell      Name:     MINAL GARVIN   MRN:      [id number]        Account:      [id number]   :      1934           Service Date: 2020      Document: [id number]

## 2020-07-28 NOTE — LETTER
2020       RE: Kiara Pacheco  [address]  [place] [address]     Dear Colleague,    Thank you for referring your patient, Kiara Pacheco, to the [facility] NEUROLOGY at [facility]. Please see a copy of my visit note below.    Service Date: 2020      Ron Joya MD   [place] [id number] [address]      RE: Kiara Pacheco    MRN: [id number]   : 1934      Dear Dr. Joya:       This patient, Mrs. Pacheco, is 86 years old, and she had been evaluated by my associate Guille Powers.  This is a followup phone call on the previous assessment.  The history is difficult to obtain.  We did go through an .  The history from her niece, Jamila, is that she would drop her blood pressure, and she would pass out and be unresponsive.  No convulsive activity is described.  This is fairly much what Dr. Powers has described in his notes.  Dr. Powers had seen the patient in January for Parkinson disease.  This episode of unresponsiveness has been witnessed 3 times.  She had one with a stress test, and her blood pressure went down, an episode at the meal table and the toilet and one episode in the car where she was unresponsive while she was driving.  There have been no premonitory symptoms.  She had a neurological evaluation in person then, and there were problems with vision in her right eye.  Movements were conjugate.  Reflexes were absent in the ankles and trace in the knees.  Toes were downgoing.  She has a history of tremor, which is part of the Parkinson.  A video of an episode was witnessed by Dr. Poewrs, and she just appeared unresponsive without any shaking.  She has been seen at [facility].  An MRI scan of the brain was normal.  They need things that should be recording from inside her brain, as she thinks she had a seizure.  She is requesting that anticonvulsants be given.  She has a history  of breast cancer and had surgery.  The patient has a long-standing history of Parkinson disease.  An EEG was obtained over a period of 2 hours after Dr. Powers saw her, and there was only mild slowing, but no seizure activity.  No medical treatment was offered, and the side effect profile was for possible seizure.  This patient was then referred to us in the Epilepsy Service.  Talking to Kiara today was difficult.  Apparently, she has no warning.  She has no history of epilepsy.  No history of stroke or any other neurological problem except the Parkinson.        We discussed the EEG findings with her.  She wants some sort of intracranial electrodes placed for it.  I told her we could not make a diagnosis of epilepsy at this time.  She has had a cardiac evaluation, which appears the most likely cause of her syncope.  She has seen Dr. Story in Cardiology, and a copy of this will go to him.  She had a workup prior to surgery, I believe, possibly for the breast cancer.  The note from Dr. Story indicates that the preoperative evaluation was performed in 2020, and it was recommended she have a pharmacological nuclear stress test, and she had that done.  She will have a right breast biopsy under local anesthesia.  She had a questionable history of cardiac disease and that she emigrated approximately 10 years ago from [place].  She had an echocardiogram.  The echocardiogram showed normal left ventricular size.  There was no significant valvular abnormality.  Dilated right ventricle, but normal systolic function.  She is relatively inactive.  No further cardiac evaluation is required prior to going to surgery.  No mention of these episodes.  I noted she has a history of dementia, and she is unable to answer any questions.  She has a history of hypertension, hyperlipidemia and coronary artery disease.  The patient did not have those episodes at that time, and they started subsequent to the cardiac evaluation.  The  cardiac evaluation needs to be repeated in view of these new episodes.  Dr. Story, can you please reevaluate this patient for these new episodes of what appear to be syncope.        We might need to make arrangements to admit her to the hospital once I review the video EEG the niece is going to send to us.  I think that video monitoring will be low yield, and further cardiac evaluation should be the first priority.  We will call Dr. Story, and we informed him of that.      Sincerely,      Christopher Beaver MD  I spoke with dr Story who will see her. wondering about orthotstatic hypotension. Asked nurse at home to check it out. bam      cc:   Derian Story MD   [facility]   [address], [address] [place] [address]         CHRISTOPHER BEAVER MD             D: 2020   T: 2020   MT: darrell      Name:     MINAL GARVIN   MRN:      [id number]        Account:      [id number]   :      1934           Service Date: 2020      Document: [id number]

## 2020-07-29 ENCOUNTER — TELEPHONE (OUTPATIENT)
Dept: NEUROLOGY | Facility: CLINIC | Age: 85
End: 2020-07-29

## 2020-07-29 NOTE — TELEPHONE ENCOUNTER
[facility] Call Center    Phone Message    May a detailed message be left on voicemail: yes     Reason for Call: Order(s): Other:   Reason for requested: Verbal order requested regarding Medication Change  Date needed: 07/29/20   Provider name: Dr. Priya Rodriguez is calling from [facility] with request for verbal orders regarding a recent medication change for carbidopa-levodopa (SINEMET)  MG tablet with dosage increase to 2 tablets in morning, 2 tablets in afternoon and 1 at night. She states she also would like to confirm dosage/directions for rOPINIRole (REQUIP) 0.25 MG tablet. Please give her a call back at your earliest convenience. Thank you      Action Taken: Message routed to:  [facility] ([facility] Neurology    Travel Screening: Not Applicable

## 2020-07-29 NOTE — TELEPHONE ENCOUNTER
I called Jennifer back. I gave verbal order for her carbidopa-levodopa 25 - 250mg tablet to Take two at 10am, two at 4pm, and one at 10 pm. - Oral. I confirmed no update to pt requip dose at this time.     Jennifer asked I update pt PCP Dr. Joya regarding medication update. I will fax his office updated order to fax: [phone].     Shefali BUTCHER

## 2020-08-07 ENCOUNTER — VIRTUAL VISIT (OUTPATIENT)
Dept: NEUROLOGY | Facility: CLINIC | Age: 85
End: 2020-08-07
Payer: COMMERCIAL

## 2020-08-07 DIAGNOSIS — R69 DIAGNOSIS DEFERRED: Primary | ICD-10-CM

## 2020-08-07 NOTE — LETTER
2020      RE: Kiara Pacheco  [address]  [place] [address]       Tel visit 20 min  Consent obtained.  No video possible.  fiol    Note Date: 2020      Ron Joya MD   [address]  [address]      RE: Kiara Pacheco   MRN: [id number]   : 1934      Dear Dr. Joya:      We had a virtual visit for 20 minutes with the telephone with Mrs. Pacheco's family.  Unfortunately, the video could not be set up at this time either.  We went through a [place] .  The family reports her condition is about the same.  They were able to give other little information.  They do report that she had apparently one of her episodes of loss of consciousness or syncope and the 911 crew was called and they checked her and told her that she was okay.      I have spoken with her cardiologist, Dr. Story, and he was to see her, but this apparently has not been accomplished because of transportation problem and the illness of Mrs. Pacheco.      We have attempted to see her with video, at least we could get a better idea, but this has not been possible under the circumstances.      We had the suggestion previously to check her blood pressure sitting and standing with the nurse who may be at home, but it was not possible to get that done.      She is on Sinemet, carbidopa/levodopa, and this may be producing some orthostatic changes.  Unfortunately, these have not been measured.  We will try to call again and reinforce it.  Her medications are otherwise as listed on the chart, but for some reason the Sinemet is not listed on the medication list, although the daughter or the niece, I believe her name is Jamila, reports that this is so.  She says she is a neurologist.      It was not possible to communicate very well.  We have to delay a diagnosis until she is seen, and we will put her on our schedule for a hospital clinic visit as  soon as possible.      It is an unfortunate situation, but it is not possible to set up a video conference as they apparently tried in the past and failed.      Thank you very much, Dr. Joya.  I have good memories from you and some day we will see you again.      Best regards,            Christopher Mccabe MD         D: 2020   T: 2020   MT: EM      Name:     MINAL GARVIN   MRN:      [id number]        Account:      [id number]   :      1934           Note Date: 2020      Document: [id number]

## 2020-08-07 NOTE — PROGRESS NOTES
Tel visit 20 min  Consent obtained.to do telephone visit from daughter. Patient is unable to answer.  No video possible.  bam    Talked to daughter. No video avialable.  bam

## 2020-08-07 NOTE — LETTER
8/7/2020       RE: Kiara Pacheco  [address]  [place] [address]     Dear Colleague,    Thank you for referring your patient, Kiara Pacheco, to the [facility] NEUROLOGY at [facility]. Please see a copy of my visit note below.    Tel visit 20 min  Consent obtained.  No video possible.  bam    Again, thank you for allowing me to participate in the care of your patient.      Sincerely,    Christopher Mccabe MD

## 2020-08-09 NOTE — PROGRESS NOTES
Note Date: 2020      Ron Joya MD   [place] [id number] [place] [address]      RE: Kiara Pacheco   MRN: [id number]   : 1934      Dear Dr. Joya:      We had a virtual visit for 20 minutes with the telephone with Mrs. Pacheco's family.  Unfortunately, the video could not be set up at this time either.  We went through a [place] .  The family reports her condition is about the same.  They were able to give other little information.  They do report that she had apparently one of her episodes of loss of consciousness or syncope and the 911 crew was called and they checked her and told her that she was okay.      I have spoken with her cardiologist, Dr. Story, and he was to see her, but this apparently has not been accomplished because of transportation problem and the illness of Mrs. Pacheco.      We have attempted to see her with video, at least we could get a better idea, but this has not been possible under the circumstances.      We had the suggestion previously to check her blood pressure sitting and standing with the nurse who may be at home, but it was not possible to get that done.      She is on Sinemet, carbidopa/levodopa, and this may be producing some orthostatic changes.  Unfortunately, these have not been measured.  We will try to call again and reinforce it.  Her medications are otherwise as listed on the chart, but for some reason the Sinemet is not listed on the medication list, although the daughter or the niece, I believe her name is Jamila, reports that this is so.  She says she is a neurologist.      It was not possible to communicate very well.  We have to delay a diagnosis until she is seen, and we will put her on our schedule for a hospital clinic visit as soon as possible.      It is an unfortunate situation, but it is not possible to set up a video conference as they apparently tried in the past and  failed.      Thank you very much, Dr. Joya.  I have good memories from you and some day we will see you again.      Best regards,            MD SHERLY Hutchinson MD             D: 2020   T: 2020   MT: EM      Name:     MINAL GARVIN   MRN:      [id number]        Account:      [id number]   :      1934           Note Date: 2020      Document: [id number]

## 2020-10-09 ENCOUNTER — OFFICE VISIT (OUTPATIENT)
Dept: NEUROLOGY | Facility: CLINIC | Age: 85
End: 2020-10-09
Payer: COMMERCIAL

## 2020-10-09 VITALS — TEMPERATURE: 97.5 F

## 2020-10-09 DIAGNOSIS — R55 SYNCOPE, UNSPECIFIED SYNCOPE TYPE: Primary | ICD-10-CM

## 2020-10-09 RX ORDER — LEVETIRACETAM 250 MG/1
TABLET ORAL
Qty: 62 TABLET | Refills: 2 | Status: SHIPPED | OUTPATIENT
Start: 2020-10-09 | End: 2021-01-15

## 2020-10-09 NOTE — PATIENT INSTRUCTIONS
Times of Days AM  PM       Medication Tablet Size Number of Tablets/Capsules Total Daily Dosage   First week  levetiracetam 250 mg  1/2   1/2     250 mg                      second week levetiracetam 250 mg  1    1      500 mg                                                                                               Increase levetiracetam slowly as above. If too sleepy reduce to 1/2 tablets twice daily.        Carry this with you at all times.  CONTINUE TAKING YOUR OTHER MEDICATIONS AS PREVIOUSLY DIRECTED.      * * *Do not store medications in the bathroom.  Keep medications away from children!* * *
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
## 2020-10-20 DIAGNOSIS — G20.A1 PARALYSIS AGITANS (H): ICD-10-CM

## 2020-10-20 NOTE — TELEPHONE ENCOUNTER
RECORDS RECEIVED FROM:   DATE RECEIVED:   NOTES STATUS DETAILS   OFFICE NOTE from referring provider    Internal Dr. Stanley neuro 10-9-20   OFFICE NOTE from other cardiologist    Care Everywhere 6-25-20 Dr. Jez Fox   DISCHARGE SUMMARY from hospital    N/A    DISCHARGE REPORT from the ER   Care Everywhere 6-19-20 [facility]   OPERATIVE REPORT    N/A    MEDICATION LIST   Internal    LABS     BMP   Care Everywhere 6-19-20   CBC   Care Everywhere 6-19-20   CMP   N/A    Lipids   N/A    TSH   N/A    DIAGNOSTIC PROCEDURES     EKG   In process 6-20-20   Monitor Reports   N/A    IMAGING (DISC & REPORT)      Echo   In process 6-16-20   Stress Tests   In process 6-19-20   Cath   N/A    MRI/MRA   N/A    CT/CTA   N/A      Action    Action Taken 10-20: requested ekg 6-20-20, 6-19-20 from [facility]  10-20: requested ekg 6-12-20 from [facility]  10-20: requested echo 6-16-20 and stress test 6-23-20 from [facility]  10-21: sent EKG from [facility] to scanning, resolved stress test  10-27: sent EKG from [facility] to scanning  10-27: sent second request for echo from [facility]  11-3: resolved echo

## 2020-10-21 RX ORDER — ROPINIROLE 0.25 MG/1
TABLET, FILM COATED ORAL
Qty: 180 TABLET | Refills: 0 | Status: SHIPPED | OUTPATIENT
Start: 2020-10-21 | End: 2020-11-16

## 2020-10-21 NOTE — TELEPHONE ENCOUNTER
Rx Authorization:    Requested Medication/ Dose: Ropinerole 0.25    Date last refill ordered: 1/27/20    Quantity ordered: 180    # refills: 6    Date of last clinic visit with ordering provider: 8/7/20    Date of next clinic visit with ordering provider: 0    All pertinent protocol data (lab date/result):     Include pertinent information from patients message:

## 2020-10-23 ENCOUNTER — TELEPHONE (OUTPATIENT)
Dept: NEUROLOGY | Facility: CLINIC | Age: 85
End: 2020-10-23

## 2020-10-23 NOTE — TELEPHONE ENCOUNTER
Call returned to the home care service.    The staff were out to the patient, and the family are giving the latient 125mg three times daily.  The staff felt this was likely because the patient had shown some irritability / aggression.    Original instructions were for 125 mg bid for one week, then 250 mg bid.  Instructions also show to reduce to 125 mg bid if side effects.    The home care staff is not sure if the patient has ever reached 250 mg bid.    More of an information call, no other questions    Will update       Chart routed to

## 2020-10-23 NOTE — TELEPHONE ENCOUNTER
[facility] Call Center    Phone Message    May a detailed message be left on voicemail: yes     Reason for Call: Other: Jennifer is calling from [facility] in regards   to confirmed dosing for patient to take half a tablet 3 times a day. Current instructions state patient to take one pill twice daily.  Medication: levETIRAcetam (KEPPRA) 250 MG tablet [81710] (Order [id number])      Please advise.      Action Taken: Other: [facility]    Travel Screening: Not Applicable

## 2020-11-16 DIAGNOSIS — G20.A1 PARALYSIS AGITANS (H): ICD-10-CM

## 2020-11-17 RX ORDER — ROPINIROLE 0.25 MG/1
TABLET, FILM COATED ORAL
Qty: 180 TABLET | Refills: 0 | Status: SHIPPED | OUTPATIENT
Start: 2020-11-17 | End: 2020-12-30

## 2020-11-17 NOTE — TELEPHONE ENCOUNTER
Rx Authorization:    Requested Medication/ Dose rOPINIROLE (requip) 0.25 mg    Date last refill ordered: 10/21/20    Quantity ordered: 180    # refills: 0    Date of last clinic visit with ordering provider: 10/9/20    Date of next clinic visit with ordering provider:     All pertinent protocol data (lab date/result):     Include pertinent information from patients message:

## 2020-11-18 ENCOUNTER — OFFICE VISIT (OUTPATIENT)
Dept: CARDIOLOGY | Facility: CLINIC | Age: 85
End: 2020-11-18
Attending: PSYCHIATRY & NEUROLOGY
Payer: COMMERCIAL

## 2020-11-18 ENCOUNTER — PRE VISIT (OUTPATIENT)
Dept: CARDIOLOGY | Facility: CLINIC | Age: 85
End: 2020-11-18

## 2020-11-18 VITALS
HEART RATE: 89 BPM | OXYGEN SATURATION: 99 % | WEIGHT: 125 LBS | DIASTOLIC BLOOD PRESSURE: 53 MMHG | BODY MASS INDEX: 26.56 KG/M2 | SYSTOLIC BLOOD PRESSURE: 70 MMHG

## 2020-11-18 DIAGNOSIS — R55 SYNCOPE, UNSPECIFIED SYNCOPE TYPE: Primary | ICD-10-CM

## 2020-11-18 PROCEDURE — 93005 ELECTROCARDIOGRAM TRACING: CPT

## 2020-11-18 PROCEDURE — 99204 OFFICE O/P NEW MOD 45 MIN: CPT | Performed by: INTERNAL MEDICINE

## 2020-11-18 PROCEDURE — 93010 ELECTROCARDIOGRAM REPORT: CPT | Performed by: INTERNAL MEDICINE

## 2020-11-18 PROCEDURE — G0463 HOSPITAL OUTPT CLINIC VISIT: HCPCS | Mod: 25

## 2020-11-18 RX ORDER — ISOSORBIDE MONONITRATE 30 MG/1
60 TABLET, EXTENDED RELEASE ORAL AT BEDTIME
Start: 2020-11-18

## 2020-11-18 ASSESSMENT — PAIN SCALES - GENERAL: PAINLEVEL: NO PAIN (0)

## 2020-11-18 NOTE — NURSING NOTE
Chief Complaint   Patient presents with     New Patient     new - referral 10-9-20 - neurology - Lizeth        Vitals were taken and medications were reconciled. EKG was performed.    Lelsie Kelly  9:36 AM
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
## 2020-11-18 NOTE — PROGRESS NOTES
Reason for Visit:  Today I have seen Kiara Pacheco for   Consult: No Yes    HPI : Status / Symptoms / Concerns     86-year-old female with longstanding hypertension and Parkinson's disease and recent unexplained syncopal events (please see below).  She is in today with her niece who is a [place] trained neurologist.  She strongly believes that she has a seizure disorder based on the movement patterns with the episodes.  Having a stable blood pressure control seems to be also an issue. Her niece describes a longstanding history of Parkinson disease that is now complicated by dementia.  When she becomes unresponsive it is not preceded by symptoms and typically lasts minutes.  Her niece recently noticed premature contractions and restarted Norvasc which apparently helped.    History of syncope (Dr Mccabe):  The history from her niece, Jamila, is that she would drop her blood pressure, and she would pass out and be unresponsive.  No convulsive activity is described.  This is fairly much what Dr. Powers has described in his notes.  Dr. Powers had seen the patient in January for Parkinson disease.  This episode of unresponsiveness has been witnessed 3 times.  She had one with a stress test, and her blood pressure went down, an episode at the meal table and the toilet and one episode in the car where she was unresponsive while she was driving.  There have been no premonitory symptoms    Chest Pain:   Possible chest pressure with hypertension  Shortness of Breath:  No  Ankle Swelling:  No  Muscle Aches:  No  Palpitations:   No    Lightheadedness:  No  Fainting:   No  Medication Issues:  No  Recent Test:  No    Past medical history:  Hypertension  Possible coronary artery disease  Dyslipidemia  Parkinson's disease (longstanding)  Dementia  Status post breast cancer      Other Systems:  Resp - / GI - /MS - /Kam -seizures?/Psy - /Derm - /Hem - / - /Lymph - /ENT -/ Endo - No other pertinent concern in systems review.      Social History: reports that she has never smoked. She has never used smokeless tobacco. She reports that she does not drink alcohol.   I have reviewed this patient's social history and updated it with pertinent information if needed.    Family History: No family history of premature cardiac disease    Medications:  Current Outpatient Medications   Medication     ARIPiprazole (ABILIFY) 5 MG tablet     aspirin 81 MG tablet     calcium-vitamin D (CALCIUM 600 + D) 600-400 MG-UNIT per tablet     carbidopa-levodopa (SINEMET)  MG tablet     isosorbide mononitrate (IMDUR) 30 MG 24 hr tablet     levETIRAcetam (KEPPRA) 250 MG tablet     nitroglycerin (NITROSTAT) 0.4 MG SL tablet     rOPINIRole (REQUIP) 0.25 MG tablet     senna-docusate (SENEXON-S) 8.6-50 MG per tablet     acetaminophen (TYLENOL) 500 MG tablet     alendronate (FOSAMAX) 40 MG TABS     ALENDRONATE SODIUM PO     docusate sodium (COLACE) 100 MG capsule     donepezil (ARICEPT) 10 MG tablet     ranitidine (ZANTAC) 150 MG tablet     rosuvastatin (CRESTOR) 10 MG tablet     rosuvastatin (CRESTOR) 10 MG tablet     senna-docusate (SENNA-S) 8.6-50 MG per tablet     No current facility-administered medications for this visit.       Exam:  BP (!) 70/53 (BP Location: Right arm, Patient Position: Sitting, Cuff Size: Adult Regular)   Pulse 89   Wt 56.7 kg (125 lb)   SpO2 99%   BMI 26.56 kg/m     There is no height or weight on file to calculate BMI.   General:  Alert, oriented, no acute distress, non-slow high ormal chair rise, walking not impaired   Eyes:  External exam normal, Conjunctivae noninjected and nonicteric.  Mouth:  Moist mucous membranes, restored teeth  Ears:  Hearing grossly intact  Neck:  No thyromegaly. Carotid upstroke normal, no carotid bruit, no JVD  Lungs:  Clear to auscultation bilaterally. No wheezes, crackles, rales or rhonchi,      no accessory muscle use   Heart:  Regular, normal S1 and S2, no obvious murmurs, no rubs or  gallops  Abdomen: Soft, non-tender  Extremities: No peripheral edema, age appropriate skin without stasis  Pulses: Pedal 2+ bilaterally  Kam/Psy: Non-focal        Vital Trend:  Wt Readings from Last 3 Encounters:   11/18/20 56.7 kg (125 lb)   01/27/20 61.2 kg (135 lb)   08/19/19 61.6 kg (135 lb 12.8 oz)     BP Readings from Last 3 Encounters:   01/27/20 112/75   08/19/19 110/82   05/04/17 (!) 172/113     Pulse Readings from Last 3 Encounters:   01/27/20 73   08/19/19 75   05/04/17 112          Data:     ECG 2020:   NSR with nonspecific ST changes    Echocardiogram 2017 ([facility]):   Normal left ventricular systolic function.   The ejection fraction is visually estimated at 60%.   No regional wall motion abnormalities.   Normal left ventricular size.   The right ventricle is normal in size and function.   No significant valvular heart disease noted.   No cardioembolic source seen.   There were no prior studies available for comparison.   Estimated EF: 60%    Nuclear Perfusion Scan 2020:  1. Probably normal myocardial perfusion study, despite breast/soft tissue   attenuation artifact which may decrease sensitivity for coronary artery   disease.   2. No obvious inducible ischemia or infarction.   3. Normal left ventricular size & calculated ejection fraction; no obvious   left ventricular regional wall motion abnormalities noted.      Lab Review:  No results found for: CR, LDL, HDL, BNP, NTBNPI, POTASSIUM, NA      Assessment:     Kiara Pacheco is a 86 year old female with unexplained episodes of unresponsiveness.  Unannounced without prodrome.  Her relative ([facility] trained neurologist) believes that she demonstrates seizure activities.    We do not believe that her symptoms have a likely cardiovascular cause.  However, there is a possibility that she has vasomotor/autonomic instability from longstanding Parkinson's disease (her blood pressure today is low). To attempt to address this potential issue we have  recommended continued liberal salt consumption and compression stockings.  We also strongly suggested not to take any beta-blocker (she was on atenolol and metoprolol in the past) and try to discontinue the amlodipine (her relative thinks that it helps with tachycardia which is not the case).  She takes Imdur in the evening which is probably okay. We have agreed that if these episodes persist that we would order a ZIO Patch Holter to exclude arrhythmias. This could be done over the phone.     Plan:     1.  Potential autonomic instability      - Compression stockings, liberal salt consumption       - Avoidance of antiadrenergic agents +/-amlodipine    Contingency Plan:   1. ZIO Patch   2. fludrocortisone if hypotensive syncopal events have been documented    Follow-up: As needed    This note was software transcribed.

## 2020-11-18 NOTE — PATIENT INSTRUCTIONS
Patient Instructions:  STOP taking amlodipine.   Recommend compression stockings and increasing salt intake as tolerated.   If you continue to faint, we can send you a holter monitor to further assess.  Let us know by calling the number above or sending a [employer] message.       It was a pleasure to see you in the cardiology clinic today.    We are encouraging the use of [employer] to communicate with your Healthcare Provider.  If you have any questions, call  Ousmane Trujillo LPN, at [phone].  Press Option #1 for the [facility], and then press Option #4 for nursing.  Cardiology Fax  : [phone]      If you have an urgent need after hours (8:00 am to 4:30 pm) please call [phone] and ask for the cardiology fellow on call.

## 2020-11-19 LAB — INTERPRETATION ECG - MUSE: NORMAL

## 2020-12-14 ENCOUNTER — HEALTH MAINTENANCE LETTER (OUTPATIENT)
Age: 85
End: 2020-12-14

## 2020-12-28 DIAGNOSIS — I45.6 ANOMALOUS ATRIOVENTRICULAR EXCITATION: ICD-10-CM

## 2020-12-28 DIAGNOSIS — G20.A1 PARALYSIS AGITANS (H): ICD-10-CM

## 2020-12-28 RX ORDER — ARIPIPRAZOLE 5 MG/1
2.5 TABLET ORAL DAILY
Status: CANCELLED | OUTPATIENT
Start: 2020-12-28

## 2020-12-28 NOTE — TELEPHONE ENCOUNTER
What is the concern that needs to be addressed by a nurse? rx refills, previously saw Dr. Powers    May a detailed message be left on voicemail? yes    Date of last office visit: unk    Message routed to: [facility] med refills team     ropinirole   Abilify  carbidopa-levodopa    Insurance: [employer]    New pharmacy: [facility]

## 2020-12-30 RX ORDER — CARBIDOPA/LEVODOPA 25MG-250MG
1 TABLET ORAL SEE ADMIN INSTRUCTIONS
Qty: 150 TABLET | Refills: 6 | Status: SHIPPED | OUTPATIENT
Start: 2020-12-30 | End: 2021-03-29

## 2020-12-30 RX ORDER — ROPINIROLE 0.25 MG/1
TABLET, FILM COATED ORAL
Qty: 180 TABLET | Refills: 0 | Status: SHIPPED | OUTPATIENT
Start: 2020-12-30 | End: 2021-02-26

## 2020-12-30 NOTE — TELEPHONE ENCOUNTER
What is the concern that needs to be addressed by a nurse? Asking again about rx    May a detailed message be left on voicemail? yes    Date of last office visit: 10/2020 saw dr suarez     Message routed to: [facility] rn pool    Pt out of ropinirole, send to [facility], ins is [employer]

## 2021-01-15 ENCOUNTER — OFFICE VISIT (OUTPATIENT)
Dept: NEUROLOGY | Facility: CLINIC | Age: 86
End: 2021-01-15
Payer: COMMERCIAL

## 2021-01-15 VITALS
OXYGEN SATURATION: 98 % | HEART RATE: 115 BPM | DIASTOLIC BLOOD PRESSURE: 84 MMHG | RESPIRATION RATE: 16 BRPM | SYSTOLIC BLOOD PRESSURE: 119 MMHG

## 2021-01-15 DIAGNOSIS — B02.9 HERPES ZOSTER WITHOUT COMPLICATION: Primary | ICD-10-CM

## 2021-01-15 DIAGNOSIS — R55 SYNCOPE, UNSPECIFIED SYNCOPE TYPE: ICD-10-CM

## 2021-01-15 PROBLEM — F02.80 DEMENTIA DUE TO PARKINSON'S DISEASE WITHOUT BEHAVIORAL DISTURBANCE (H): Status: ACTIVE | Noted: 2017-06-02

## 2021-01-15 PROBLEM — R74.8 ELEVATED CK: Status: ACTIVE | Noted: 2017-05-27

## 2021-01-15 PROBLEM — M19.072 OSTEOARTHRITIS OF LEFT FOOT: Status: ACTIVE | Noted: 2021-01-15

## 2021-01-15 PROBLEM — K62.5 BRBPR (BRIGHT RED BLOOD PER RECTUM): Status: ACTIVE | Noted: 2020-11-29

## 2021-01-15 PROBLEM — R29.898 WEAKNESS OF LEFT LOWER EXTREMITY: Status: ACTIVE | Noted: 2017-05-27

## 2021-01-15 PROBLEM — G20.A1 DEMENTIA DUE TO PARKINSON'S DISEASE WITHOUT BEHAVIORAL DISTURBANCE (H): Status: ACTIVE | Noted: 2017-06-02

## 2021-01-15 PROBLEM — F03.92 DEMENTIA WITH PSYCHOSIS (H): Status: ACTIVE | Noted: 2018-04-20

## 2021-01-15 PROBLEM — M17.0 PRIMARY OSTEOARTHRITIS OF BOTH KNEES: Status: ACTIVE | Noted: 2021-01-15

## 2021-01-15 PROBLEM — Z86.59 HX OF PSYCHOSIS: Status: ACTIVE | Noted: 2021-01-15

## 2021-01-15 PROBLEM — R15.9 INCONTINENCE OF FECES: Status: ACTIVE | Noted: 2018-04-02

## 2021-01-15 PROBLEM — C50.911 MALIGNANT NEOPLASM OF RIGHT FEMALE BREAST (H): Status: ACTIVE | Noted: 2020-05-29

## 2021-01-15 PROBLEM — R94.31 LONG QT INTERVAL: Status: ACTIVE | Noted: 2019-05-01

## 2021-01-15 PROBLEM — D64.9 ANEMIA: Status: ACTIVE | Noted: 2020-11-29

## 2021-01-15 PROBLEM — R12 HEARTBURN: Status: ACTIVE | Noted: 2021-01-15

## 2021-01-15 PROBLEM — Z71.89 ACP (ADVANCE CARE PLANNING): Status: ACTIVE | Noted: 2017-06-06

## 2021-01-15 PROBLEM — M25.552 PAIN OF LEFT HIP JOINT: Status: ACTIVE | Noted: 2021-01-15

## 2021-01-15 PROBLEM — N20.0 NEPHROLITHIASIS: Status: ACTIVE | Noted: 2021-01-15

## 2021-01-15 PROBLEM — R09.89 LABILE HYPERTENSION: Status: ACTIVE | Noted: 2017-08-11

## 2021-01-15 PROCEDURE — 99417 PROLNG OP E/M EACH 15 MIN: CPT | Performed by: INTERNAL MEDICINE

## 2021-01-15 PROCEDURE — 99215 OFFICE O/P EST HI 40 MIN: CPT | Performed by: INTERNAL MEDICINE

## 2021-01-15 RX ORDER — LORATADINE 10 MG/1
10 TABLET, ORALLY DISINTEGRATING ORAL PRN
Qty: 10 TABLET | Refills: 0 | Status: SHIPPED | OUTPATIENT
Start: 2021-01-15

## 2021-01-15 RX ORDER — VALACYCLOVIR HYDROCHLORIDE 1 G/1
1000 TABLET, FILM COATED ORAL 3 TIMES DAILY
Qty: 21 TABLET | Refills: 0 | Status: SHIPPED | OUTPATIENT
Start: 2021-01-15 | End: 2021-01-22

## 2021-01-15 RX ORDER — LEVETIRACETAM 500 MG/1
500 TABLET ORAL 2 TIMES DAILY
Qty: 60 TABLET | Refills: 3 | Status: SHIPPED | OUTPATIENT
Start: 2021-01-15 | End: 2021-05-24

## 2021-01-15 NOTE — PROGRESS NOTES
[facility] Neurology Follow-up    Kiara Pacheco MRN# [id number]   Age: 86 year old YOB: 1934     Requesting physician: Ron Jack     Reason for Consultation: parkinson's disease        History of Presenting Symptoms:   Kiara Pacheco is a 86 year old female who presents today for evaluation of parkinson's disease.    Patient previously followed with Dr Powers for Parkinson's Disease. She was last seen in 7/2020. At that visit patient was having spells of unresponsiveness. She was referred to epilepsy. EEG showed no evidence of epileptiform discharges. It was felt that symptoms less likely represented seizures. After discussion with family low dose trial of Keppra was started. Patient was also referred to cardiology who did not believe there to be a cardiogenic cause. It was felt that symptoms could be related to autonomic instability in the setting of Parkinson's disease. Patient intermittently has issues with low blood pressure. Compression stockings and liberal salt intake was recommended.     Patient's niece who is a foreign trained neurologist is at visit today. She states that Keppra has greatly improved the frequency of spells. Prior to Keppra she was having spells almost daily. Now spells are happening about once per week. Niece is convinced that spells are seizures. Previous spells were described as generalized loss of muscle tone with unresponsiveness for 60-90 minutes. Today niece notes in the last 2 months patient has been having 5-10 minutes of clonic activity during the spells in both the arms and legs. Eyes are usually closed during the spells. Niece states that episodes can happen at any time throughout the day and aren't clearly associated with a certain time after taking Sinemet medication.     Parkinson's disease symptoms are stable. She takes Sinemet (25/250) 2 tablets, 2 tablets, and 1 tablet. Sinemet usually takes about 30 minutes to kick in to  improve tremor and movement. About 30-60 minutes prior to next dose, Sinemet starts to wear off. Hallucinations are stable and not bothersome. Patient continues on Aripiprazole. Patient has had episodes of more severe psychosis in the past.      Patient is only able to walk with assistance. She lives with family who cares for her and helps with ADLs.     Red rash with vesicles has appeared in the right posterior thoracic region within the last day. Patient hasn't appeared to be bothered by pain and itching with the rash yet.       Past Medical History:     Patient Active Problem List   Diagnosis     Paralysis agitans (H)     No past medical history on file.     Past Surgical History:   No past surgical history on file.     Social History:     Social History     Tobacco Use     Smoking status: Never Smoker     Smokeless tobacco: Never Used   Substance Use Topics     Alcohol use: No     Drug use: Not on file        Family History:   No family history on file.     Medications:     Current Outpatient Medications   Medication Sig     acetaminophen (TYLENOL) 500 MG tablet Take 2 tablets by mouth every 6 hours as needed     alendronate (FOSAMAX) 40 MG TABS      ALENDRONATE SODIUM PO Take 40 mg by mouth once a week     ARIPiprazole (ABILIFY) 5 MG tablet Take 2.5 mg by mouth     aspirin 81 MG tablet Take 1 tablet by mouth daily     calcium-vitamin D (CALCIUM 600 + D) 600-400 MG-UNIT per tablet Take 1 tablet by mouth 2 times daily     carbidopa-levodopa (SINEMET)  MG tablet Take 1 tablet by mouth See Admin Instructions Take two at 10am, two at 4pm, and one at 10 pm.     docusate sodium (COLACE) 100 MG capsule Take 1 capsule by mouth daily     donepezil (ARICEPT) 10 MG tablet Take 10 mg by mouth     isosorbide mononitrate (IMDUR) 30 MG 24 hr tablet Take 2 tablets (60 mg) by mouth At Bedtime     levETIRAcetam (KEPPRA) 250 MG tablet Take one pill twice daily.     nitroglycerin (NITROSTAT) 0.4 MG SL tablet Place 1 tablet  under the tongue every 5 minutes as needed     ranitidine (ZANTAC) 150 MG tablet Take 1 tablet by mouth 3 times daily One tab in AM, 0.5 tab midday, one pill in PM.      rOPINIRole (REQUIP) 0.25 MG tablet TAKE TWO TABLETS BY MOUTH THREE TIMES DAILY     rosuvastatin (CRESTOR) 10 MG tablet Take 10 mg by mouth     rosuvastatin (CRESTOR) 10 MG tablet Take 1 tablet by mouth At Bedtime     senna-docusate (SENEXON-S) 8.6-50 MG per tablet Take 1 tablet by mouth     senna-docusate (SENNA-S) 8.6-50 MG per tablet Take 1 tablet by mouth 2 times daily     No current facility-administered medications for this visit.         Allergies:     Allergies   Allergen Reactions     Penicillins Palpitations and Rash     Entacapone [Kdc:Entacapone+Yellow Dye] Other (See Comments)     confusion     Erythromycin Nausea     Hydralazine Other (See Comments) and Nausea and Vomiting     HA     Lidocaine         Review of Systems:   As above     Physical Exam:   Vitals: /84   Pulse 115   Resp 16   SpO2 98%    General: Seated comfortably in no acute distress.  Heart: Regular rate  Lungs: breathing comfortably  Extremities: no edema  Skin: erythematous rash with vesicles noted in the right posterior mid thoracic region around T6-7.  Neurologic:     Mental Status: Fully alert, but slightly inattentive. Able to count backwards from 10-1, but very slowly. Oriented to person, but not to place or time. Impaired memory and fund of knowledge. Language normal, speech clear and fluent, no paraphasic errors.     Cranial Nerves: Severely impaired vision, unable to count fingers bilaterally at 3 feet. PERRL. EOMI. Facial sensation intact/symmetric. Facial movements symmetric. Hearing not formally tested but intact to conversation. No dysarthria. Shoulder shrug strong bilaterally. Tongue protrusion midline. Tongue movements intact.     Motor: Intermittent resting tremor noted in bilateral upper extremities (left > right). Difficulty getting patient to  relax, but no obvious cogwheel rigidity appreciated on exam. Bradykinesia in bilateral hand open/close movements. Strength 5/5 throughout upper and lower extremities.     Deep Tendon Reflexes: 1+/symmetric throughout upper and lower extremities.      Sensory: Intact/symmetric to light touch throughout upper and lower extremities. Negative Romberg.      Coordination: Rapid alternating movements intact/symmetric with bradykinesia     Gait: Able to stand with assistance. Gait deferred today due to lack of assistance.          Data: Pertinent prior to visit   Imaging:  MRI brain 6/2020  1. No acute intracranial abnormality evident.  2. Moderate parenchymal volume loss and moderate chronic microvascular ischemic change. No hydrocephalus.    Procedures:  EEG 7/2020  Impression:  This is a mildly abnormal EEG due to the presence of mild slowing of the posterior dominant rhythm.  Finding are consistent with mild diffuse encephalopathy, of which the etiology is non-specific.    Laboratory:  12/2020 - BMP with hypokalemia to 3.0, otherwise unremarkable, Hgb 9 with low MCV         Assessment and Plan:   Assessment:  Kiara Pacheco is a 86 year old female who presents today for evaluation of parkinson's disease. Patient previously followed with Dr Powers for many years, at least since 2010. Symptoms of tremor, gait problems, cognitive impairment and hallucinations are stable. Patient is also on Aripiprazole. Patient was seen in Epilepsy several months ago with Dr Stanley. Keppra trial of 250 mg BID was initiated. Tone notes that frequency of spells have improved from almost daily to about once a week or less. The spells are now associated with clonic movements of the extremities, but eyes are closed. Unclear at this point what etiology of spells are. Given clonic movements and improvement on Keppra we discussed trial of increased dose of 500 mg BID. Hypotension have been documented at multiple previous appointments  including 70/53 at appointment on 11/18/2020. Blood pressure is normal today. Hypotension and somnolence as a side effect of Sinemet remains possible cause of spells. Could consider trial of Sinemet-CR as suggested by Dr Stanley or lower dose of Sinemet at future visit if increased Keppra does not improve spells.     Vesicular rash in the mid thoracic thoracic region is noted today which is consistent with shingles infection. Prescription for Vacyclovir also given.      Plan:  - Keppra 500 mg BID  - Continue Sinemet (25/250) 2 tablets at 10 AM, 2 tablets at 4 PM, and 1 tablet at 10 PM  - Vacyclovir 1000 mg TID for 7 days; Loratadine as needed for itching    Follow up in Neurology clinic in 3 months or earlier as needed should new concerns arise.    Cheng Ulloa MD   of Neurology  [facility]    The total time of this encounter amounted to 80 minutes. This time included time spent with the patient, prep work, ordering tests, and performing post visit documentation.

## 2021-01-15 NOTE — LETTER
January 15, 2021    Kiara Pacheco  [address]  [place] [address]    To whom it may concern,     Patient follows with me in neurology clinic. Due to her medical condition she requires 24 hour supervision. Please contact me with any questions or concerns.       Cheng Ulloa MD   of Neurology  [facility]

## 2021-01-15 NOTE — LETTER
1/15/2021       RE: Kiara Pacheco  [address] [facility] [address]     Dear Colleague,    Thank you for referring your patient, Kiara Pacheco, to the [facility] [place] at [facility]. Please see a copy of my visit note below.    [facility] Neurology Follow-up    Kiara Pacheco MRN# [id number]   Age: 86 year old YOB: 1934     Requesting physician: Ron Jack     Reason for Consultation: parkinson's disease        History of Presenting Symptoms:   Kiara Pacheco is a 86 year old female who presents today for evaluation of parkinson's disease.    Patient previously followed with Dr Powers for Parkinson's Disease. She was last seen in 7/2020. At that visit patient was having spells of unresponsiveness. She was referred to epilepsy. EEG showed no evidence of epileptiform discharges. It was felt that symptoms less likely represented seizures. After discussion with family low dose trial of Keppra was started. Patient was also referred to cardiology who did not believe there to be a cardiogenic cause. It was felt that symptoms could be related to autonomic instability in the setting of Parkinson's disease. Patient intermittently has issues with low blood pressure. Compression stockings and liberal salt intake was recommended.     Patient's niece who is a foreign trained neurologist is at visit today. She states that Keppra has greatly improved the frequency of spells. Prior to Keppra she was having spells almost daily. Now spells are happening about once per week. Niece is convinced that spells are seizures. Previous spells were described as generalized loss of muscle tone with unresponsiveness for 60-90 minutes. Today niece notes in the last 2 months patient has been having 5-10 minutes of clonic activity during the spells in both the arms and legs. Eyes are usually closed during the spells.  Niece states that episodes can happen at any time throughout the day and aren't clearly associated with a certain time after taking Sinemet medication.     Parkinson's disease symptoms are stable. She takes Sinemet (25/250) 2 tablets, 2 tablets, and 1 tablet. Sinemet usually takes about 30 minutes to kick in to improve tremor and movement. About 30-60 minutes prior to next dose, Sinemet starts to wear off. Hallucinations are stable and not bothersome. Patient continues on Aripiprazole. Patient has had episodes of more severe psychosis in the past.      Patient is only able to walk with assistance. She lives with family who cares for her and helps with ADLs.     Red rash with vesicles has appeared in the right posterior thoracic region within the last day. Patient hasn't appeared to be bothered by pain and itching with the rash yet.       Past Medical History:     Patient Active Problem List   Diagnosis     Paralysis agitans (H)     No past medical history on file.     Past Surgical History:   No past surgical history on file.     Social History:     Social History     Tobacco Use     Smoking status: Never Smoker     Smokeless tobacco: Never Used   Substance Use Topics     Alcohol use: No     Drug use: Not on file        Family History:   No family history on file.     Medications:     Current Outpatient Medications   Medication Sig     acetaminophen (TYLENOL) 500 MG tablet Take 2 tablets by mouth every 6 hours as needed     alendronate (FOSAMAX) 40 MG TABS      ALENDRONATE SODIUM PO Take 40 mg by mouth once a week     ARIPiprazole (ABILIFY) 5 MG tablet Take 2.5 mg by mouth     aspirin 81 MG tablet Take 1 tablet by mouth daily     calcium-vitamin D (CALCIUM 600 + D) 600-400 MG-UNIT per tablet Take 1 tablet by mouth 2 times daily     carbidopa-levodopa (SINEMET)  MG tablet Take 1 tablet by mouth See Admin Instructions Take two at 10am, two at 4pm, and one at 10 pm.     docusate sodium (COLACE) 100 MG capsule  Take 1 capsule by mouth daily     donepezil (ARICEPT) 10 MG tablet Take 10 mg by mouth     isosorbide mononitrate (IMDUR) 30 MG 24 hr tablet Take 2 tablets (60 mg) by mouth At Bedtime     levETIRAcetam (KEPPRA) 250 MG tablet Take one pill twice daily.     nitroglycerin (NITROSTAT) 0.4 MG SL tablet Place 1 tablet under the tongue every 5 minutes as needed     ranitidine (ZANTAC) 150 MG tablet Take 1 tablet by mouth 3 times daily One tab in AM, 0.5 tab midday, one pill in PM.      rOPINIRole (REQUIP) 0.25 MG tablet TAKE TWO TABLETS BY MOUTH THREE TIMES DAILY     rosuvastatin (CRESTOR) 10 MG tablet Take 10 mg by mouth     rosuvastatin (CRESTOR) 10 MG tablet Take 1 tablet by mouth At Bedtime     senna-docusate (SENEXON-S) 8.6-50 MG per tablet Take 1 tablet by mouth     senna-docusate (SENNA-S) 8.6-50 MG per tablet Take 1 tablet by mouth 2 times daily     No current facility-administered medications for this visit.         Allergies:     Allergies   Allergen Reactions     Penicillins Palpitations and Rash     Entacapone [Kdc:Entacapone+Yellow Dye] Other (See Comments)     confusion     Erythromycin Nausea     Hydralazine Other (See Comments) and Nausea and Vomiting     HA     Lidocaine         Review of Systems:   As above     Physical Exam:   Vitals: /84   Pulse 115   Resp 16   SpO2 98%    General: Seated comfortably in no acute distress.  Heart: Regular rate  Lungs: breathing comfortably  Extremities: no edema  Skin: erythematous rash with vesicles noted in the right posterior mid thoracic region around T6-7.  Neurologic:     Mental Status: Fully alert, but slightly inattentive. Able to count backwards from 10-1, but very slowly. Oriented to person, but not to place or time. Impaired memory and fund of knowledge. Language normal, speech clear and fluent, no paraphasic errors.     Cranial Nerves: Severely impaired vision, unable to count fingers bilaterally at 3 feet. PERRL. EOMI. Facial sensation  intact/symmetric. Facial movements symmetric. Hearing not formally tested but intact to conversation. No dysarthria. Shoulder shrug strong bilaterally. Tongue protrusion midline. Tongue movements intact.     Motor: Intermittent resting tremor noted in bilateral upper extremities (left > right). Difficulty getting patient to relax, but no obvious cogwheel rigidity appreciated on exam. Bradykinesia in bilateral hand open/close movements. Strength 5/5 throughout upper and lower extremities.     Deep Tendon Reflexes: 1+/symmetric throughout upper and lower extremities.      Sensory: Intact/symmetric to light touch throughout upper and lower extremities. Negative Romberg.      Coordination: Rapid alternating movements intact/symmetric with bradykinesia     Gait: Able to stand with assistance. Gait deferred today due to lack of assistance.          Data: Pertinent prior to visit   Imaging:  MRI brain 6/2020  1. No acute intracranial abnormality evident.  2. Moderate parenchymal volume loss and moderate chronic microvascular ischemic change. No hydrocephalus.    Procedures:  EEG 7/2020  Impression:  This is a mildly abnormal EEG due to the presence of mild slowing of the posterior dominant rhythm.  Finding are consistent with mild diffuse encephalopathy, of which the etiology is non-specific.    Laboratory:  12/2020 - BMP with hypokalemia to 3.0, otherwise unremarkable, Hgb 9 with low MCV         Assessment and Plan:   Assessment:  Kiara Pacheco is a 86 year old female who presents today for evaluation of parkinson's disease. Patient previously followed with Dr Powers for many years, at least since 2010. Symptoms of tremor, gait problems, cognitive impairment and hallucinations are stable. Patient is also on Aripiprazole. Patient was seen in Epilepsy several months ago with Dr Stanley. Keppra trial of 250 mg BID was initiated. Niece notes that frequency of spells have improved from almost daily to about once a week  or less. The spells are now associated with clonic movements of the extremities, but eyes are closed. Unclear at this point what etiology of spells are. Given clonic movements and improvement on Keppra we discussed trial of increased dose of 500 mg BID. Hypotension have been documented at multiple previous appointments including 70/53 at appointment on 11/18/2020. Blood pressure is normal today. Hypotension and somnolence as a side effect of Sinemet remains possible cause of spells. Could consider trial of Sinemet-CR as suggested by Dr Stanley or lower dose of Sinemet at future visit if increased Keppra does not improve spells.     Vesicular rash in the mid thoracic thoracic region is noted today which is consistent with shingles infection. Prescription for Vacyclovir also given.      Plan:  - Keppra 500 mg BID  - Continue Sinemet (25/250) 2 tablets at 10 AM, 2 tablets at 4 PM, and 1 tablet at 10 PM  - Vacyclovir 1000 mg TID for 7 days; Loratadine as needed for itching    Follow up in Neurology clinic in 3 months or earlier as needed should new concerns arise.    Cheng Ulloa MD   of Neurology  [facility]    The total time of this encounter amounted to 80 minutes. This time included time spent with the patient, prep work, ordering tests, and performing post visit documentation.        Again, thank you for allowing me to participate in the care of your patient.      Sincerely,    Cheng Ulloa MD

## 2021-02-26 ENCOUNTER — TELEPHONE (OUTPATIENT)
Dept: NEUROLOGY | Facility: CLINIC | Age: 86
End: 2021-02-26

## 2021-02-26 DIAGNOSIS — G20.A1 PARALYSIS AGITANS (H): ICD-10-CM

## 2021-02-26 RX ORDER — ROPINIROLE 0.25 MG/1
TABLET, FILM COATED ORAL
Qty: 180 TABLET | Refills: 5 | Status: SHIPPED | OUTPATIENT
Start: 2021-02-26 | End: 2023-01-01

## 2021-02-26 NOTE — TELEPHONE ENCOUNTER
[facility] Call Center    Phone Message    May a detailed message be left on voicemail: yes     Reason for Call: Other: Patients niece in law calling regarding a denial for a refill of patients Ropinrole medication, patient was told by pharmacy that Dr. Ulloa has denied that request. Please call to discuss thank you.      Action Taken: Message routed to:  [facility] ([facility] neurology    Travel Screening: Not Applicable

## 2021-02-26 NOTE — TELEPHONE ENCOUNTER
I returned call to pt mic Solis. I let her know I did not see we had received a refill request for the Ropinrole. We will send in a new prescription today. She requested pt prescription be sent to [facility] pharmacy; they are no longer using the [facility] pharmacy.     I send refill to Dr. Ulloa to sign.     Shefali BUTCHER

## 2021-03-29 DIAGNOSIS — I45.6 ANOMALOUS ATRIOVENTRICULAR EXCITATION: ICD-10-CM

## 2021-03-29 RX ORDER — CARBIDOPA/LEVODOPA 25MG-250MG
1 TABLET ORAL SEE ADMIN INSTRUCTIONS
Qty: 150 TABLET | Refills: 6 | Status: SHIPPED | OUTPATIENT
Start: 2021-03-29 | End: 2023-01-01

## 2021-03-29 NOTE — TELEPHONE ENCOUNTER
Rx Authorization:    Requested Medication/ Dose carbidopa-levodopa (SINEMET)  MG tablet    Date last refill ordered: 12/30/20    Quantity ordered: 150# refills: 6    Date of last clinic visit with ordering provider: 1/15/21    Date of next clinic visit with ordering provider:     All pertinent protocol data (lab date/result):     Include pertinent information from patients message:

## 2021-03-30 ENCOUNTER — MYC MEDICAL ADVICE (OUTPATIENT)
Dept: NEUROLOGY | Facility: CLINIC | Age: 86
End: 2021-03-30

## 2021-04-13 ENCOUNTER — TELEPHONE (OUTPATIENT)
Dept: NEUROLOGY | Facility: CLINIC | Age: 86
End: 2021-04-13

## 2021-04-13 NOTE — TELEPHONE ENCOUNTER
Spoke with pt and [place] interpretor. Pt will;l call back when ready to schedule appointment with Dr. Ulloa. First avail, in-person or virtual

## 2021-05-24 DIAGNOSIS — R55 SYNCOPE, UNSPECIFIED SYNCOPE TYPE: ICD-10-CM

## 2021-05-24 RX ORDER — LEVETIRACETAM 500 MG/1
500 TABLET ORAL 2 TIMES DAILY
Qty: 60 TABLET | Refills: 3 | Status: SHIPPED | OUTPATIENT
Start: 2021-05-24

## 2021-10-02 ENCOUNTER — HEALTH MAINTENANCE LETTER (OUTPATIENT)
Age: 86
End: 2021-10-02

## 2022-01-01 ENCOUNTER — HEALTH MAINTENANCE LETTER (OUTPATIENT)
Age: 87
End: 2022-01-01

## 2022-01-22 ENCOUNTER — HEALTH MAINTENANCE LETTER (OUTPATIENT)
Age: 87
End: 2022-01-22

## 2023-01-01 ENCOUNTER — OFFICE VISIT (OUTPATIENT)
Dept: NEUROLOGY | Facility: CLINIC | Age: 88
End: 2023-01-01
Payer: COMMERCIAL

## 2023-01-01 ENCOUNTER — TELEPHONE (OUTPATIENT)
Dept: NEUROLOGY | Facility: CLINIC | Age: 88
End: 2023-01-01

## 2023-01-01 ENCOUNTER — HEALTH MAINTENANCE LETTER (OUTPATIENT)
Age: 88
End: 2023-01-01

## 2023-01-01 ENCOUNTER — TELEPHONE (OUTPATIENT)
Dept: NEUROLOGY | Facility: CLINIC | Age: 88
End: 2023-01-01
Payer: COMMERCIAL

## 2023-01-01 VITALS
HEART RATE: 114 BPM | SYSTOLIC BLOOD PRESSURE: 105 MMHG | DIASTOLIC BLOOD PRESSURE: 72 MMHG | HEIGHT: 57 IN | TEMPERATURE: 97.5 F | BODY MASS INDEX: 27.05 KG/M2

## 2023-01-01 DIAGNOSIS — G20.A1 PARALYSIS AGITANS (H): ICD-10-CM

## 2023-01-01 DIAGNOSIS — I45.6 ANOMALOUS ATRIOVENTRICULAR EXCITATION: ICD-10-CM

## 2023-01-01 RX ORDER — CARBIDOPA/LEVODOPA 25MG-250MG
1 TABLET ORAL SEE ADMIN INSTRUCTIONS
Qty: 150 TABLET | Refills: 6 | OUTPATIENT
Start: 2023-01-01

## 2023-01-01 RX ORDER — CARBIDOPA/LEVODOPA 25MG-250MG
1 TABLET ORAL SEE ADMIN INSTRUCTIONS
Qty: 150 TABLET | Refills: 6 | Status: SHIPPED | OUTPATIENT
Start: 2023-01-01 | End: 2023-01-01

## 2023-01-01 RX ORDER — ROPINIROLE 0.25 MG/1
TABLET, FILM COATED ORAL
Qty: 180 TABLET | Refills: 5 | Status: SHIPPED | OUTPATIENT
Start: 2023-01-01

## 2023-01-01 RX ORDER — AMANTADINE HYDROCHLORIDE 100 MG/1
CAPSULE, GELATIN COATED ORAL
Qty: 31 CAPSULE | Refills: 5 | Status: SHIPPED | OUTPATIENT
Start: 2023-01-01

## 2023-01-01 RX ORDER — CARBIDOPA/LEVODOPA 25MG-250MG
1 TABLET ORAL SEE ADMIN INSTRUCTIONS
Qty: 186 TABLET | Refills: 8 | Status: SHIPPED | OUTPATIENT
Start: 2023-01-01

## 2023-01-01 RX ORDER — AMANTADINE HYDROCHLORIDE 100 MG/1
100 CAPSULE, GELATIN COATED ORAL 2 TIMES DAILY
Qty: 31 CAPSULE | Refills: 5 | Status: SHIPPED | OUTPATIENT
Start: 2023-01-01 | End: 2023-01-01

## 2023-02-03 NOTE — TELEPHONE ENCOUNTER
Dr. Stanley hasn't seen this patient in 3 years and medication was last under the care of Dr. Ulloa. I'll forward the rx to last prescriber.

## 2023-02-03 NOTE — TELEPHONE ENCOUNTER
Kiara Pacheco's spouse is requesting a refill on medication, states that patient is out of medication. Patient is scheduled for next available appointment with Dr. Stanley on 03/08/2022.

## 2023-02-07 NOTE — TELEPHONE ENCOUNTER
Writer called patient  using  services and LVM stating to call [phone] to make a follow up appointment with Dr Ulloa to have medications refilled.    Demographics updated as a phone number was out of service.  SHAHEED Cain., [place] ([facility])

## 2023-02-07 NOTE — TELEPHONE ENCOUNTER
Medication: carbidopa-levodopa (SINEMET)  MG tablet  Sig: Take 1 tablet by mouth See Admin Instructions Take two at 10am, two at 4pm, and one at 10 pm.  Date last written: 2/23/2023    Dispensed amount: 150 tabs // patient is requesting a 90 day  supply  .  Refills: 6    Requested Pharmacy: [id number] - [facility], [place] - [address] [facility]  Pt's last office visit: 1/15/21  Next scheduled office visit:       Per the RN/LPN medication refill protocol, writer is unable to refill this request.     KEL Cain, [place] ([facility])    Patient called and requested to call and make a follow up with Dr Ulloa.  number and VM left to call and schedule

## 2023-03-08 NOTE — PROGRESS NOTES
[facility] Epilepsy Care Progress Note      Patient:  Kiara Pacheco  :  3/8/1934   Age:  89 year old   Today's Office Visit:  3/8/2023    Epilepsy Data:    History of Present Illness:     89 year old with Parkinson's disease and dementia. Had been treated with high dose carbidopa levodopa. Former patient of Guille Powers.    Seen once by me Oct 2020. Daughter, a Russian trained neurologist felt that patient had seizures. At that time daughter described periods of collapse with hypotension associated with peak levodopa effect. Ariel reported monthly spells at that visit. EEG at that time showed mild slowing of background. We did not feel that her spells were epileptic but daughter persuaded us to trial levetiracetam at low doses.    Patient followed up with Dr Ulloa whom she saw once. At that time reported that levetiracetam had helped reduce the spells of collapse. Reported spells of collapse decreased from multiple per week to once per week so levetiracetam was increased to 500 mg twice a day. PD meds have since been refilled by Dr Ulloa. Daughter now presents desiring refill of sinemet and ropinrole. Daughter also desires trial of amantadine.    Daughter reports that she still gets dyskinesias at peak of dose and tremors at other times. She no longer collapses. Daughter concluded that levetiracetam was not helpful and stopped these medications.  Patient underwent EEG and MRI during admission to [facility] for COVID 2021. EEG with moderate diffuse encephalopathy and MRI was unremarkable at that time. There have been no further episodes of collapse or hypotension.    Daughter reports that function about the same. She can walk short distances with assistance but requires wheel chair for longer distances. Needs help for feeding and dressing. Difficulty swallowing and on largely thick liquid diet. No aspiration or pneumonias. Patient tolerated COVID in family better than some family  membersBea George reports some peak dyskinesias but better than previously on new sinement regimen. She has not followed with a PD specialist and is not particularly interested in doing so. Dementia no worse. Occasional hallucinations and occasionally hypersexual.     Current Outpatient Medications   Medication Sig Dispense Refill     acetaminophen (TYLENOL) 500 MG tablet Take 2 tablets by mouth every 6 hours as needed       alendronate (FOSAMAX) 40 MG TABS        ALENDRONATE SODIUM PO Take 40 mg by mouth once a week       amantadine (SYMMETREL) 100 MG capsule Take 1 capsule (100 mg) by mouth 2 times daily Take take tablet every morning 31 capsule 5     ARIPiprazole (ABILIFY) 5 MG tablet Take 2.5 mg by mouth       aspirin 81 MG tablet Take 1 tablet by mouth daily       calcium carbonate 600 mg-vitamin D 400 units 600-400 MG-UNIT per tablet Take 1 tablet by mouth 2 times daily       carbidopa-levodopa (SINEMET)  MG tablet Take 1 tablet by mouth See Admin Instructions Take one tablet six times per day. 186 tablet 8     docusate sodium (COLACE) 100 MG capsule Take 1 capsule by mouth daily       isosorbide mononitrate (IMDUR) 30 MG 24 hr tablet Take 2 tablets (60 mg) by mouth At Bedtime       levETIRAcetam (KEPPRA) 500 MG tablet Take 1 tablet (500 mg) by mouth 2 times daily Take one pill twice daily. 60 tablet 3     loratadine (CLARITIN REDITABS) 10 MG ODT Take 1 tablet (10 mg) by mouth as needed for allergies (itching) 10 tablet 0     nitroGLYcerin (NITROSTAT) 0.4 MG sublingual tablet Place 1 tablet under the tongue every 5 minutes as needed       ranitidine (ZANTAC) 150 MG tablet Take 1 tablet by mouth 3 times daily One tab in AM, 0.5 tab midday, one pill in PM.        rOPINIRole (REQUIP) 0.25 MG tablet TAKE TWO TABLETS BY MOUTH THREE TIMES DAILY 180 tablet 5     rosuvastatin (CRESTOR) 10 MG tablet Take 10 mg by mouth       rosuvastatin (CRESTOR) 10 MG tablet Take 1 tablet by mouth At Bedtime       senna-docusate  "(SENOKOT-S/PERICOLACE) 8.6-50 MG tablet Take 1 tablet by mouth       senna-docusate (SENOKOT-S/PERICOLACE) 8.6-50 MG tablet Take 1 tablet by mouth 2 times daily       valACYclovir (VALTREX) 1000 mg tablet Take 1 tablet (1,000 mg) by mouth 3 times daily for 7 days 21 tablet 0        Medication Notes:  Currently taking sinemet 25/250, one tablet six times daily. ropinrole 0.5 mg tid.    Review of Systems:  No shortness of breath or cough.  Some dysphagia.   Have you experienced a traumatic fall since your last visit: NO    Exam:    /72   Pulse 114   Temp 97.5  F (36.4  C) (Temporal)   Ht 4' 9\" (144.8 cm)   BMI 27.05 kg/m       Wt Readings from Last 5 Encounters:   11/18/20 125 lb (56.7 kg)   01/27/20 135 lb (61.2 kg)   08/19/19 135 lb 12.8 oz (61.6 kg)   04/13/15 142 lb 3.2 oz (64.5 kg)   01/12/15 132 lb 4.4 oz (60 kg)     Alert. Decreased vision. Follows simple commands in Nepalese. Exchanges simple social pleasantries with physician.  Able to get out of chair with assist, walks five feet to door, turns around, walks back to chair, sits down. Small steps. Turns en block. Unsafe on her own.  Seen 2 hours after dose. No tremor. Tone is mildly increased without cogwheeling. There is no drift or pronation. Rapid fine movements are slowed.  Deep tendon reflexes difficult to obtain.    IMPRESSION  1) Parkinson's Disease. Appears stable compared to my single exam several years ago. Still getting dyskinesias at peak of dose.  2) No evidence of epilepsy. Collapse probably related to hypotension related to peak of dose effects as we suggested. Moving to more frequent levodopa administration probably reduced peak of dose hypotensive effects and stopped collapses.   3) Cognitive dysfunction, probable dementia; difficult to assess due to language.    DISCUSSION  Discussed with daughter that effects of adding amantadine at this point will likely be modest. There is possibilty of significant side effects including " worsening hallucinations, edema, orthostatic hallucinations and livdeo reticularis (described as violaceous skin rash, often on legs). Daughter felt that this should be tried. We further discussed that we are not experts in this field and urged her to follow up with general neurology or the movement disorders group.    PLAN:  1) Continue current carbidopa/levodopa at six times per day. Continue ropinrole at current dose. Refilled for six months.  2) Asked to follow up with General Neurology, Dr Ulloa. She agreed to do so.    Total time on day of visit 25 minutes. Additional 6 minutes reviewing chart prior to visit. Total 31 minutes on day of visit. Note written later.    Maximo Stanley MD

## 2023-03-08 NOTE — LETTER
3/8/2023     RE: Kiara Pacheco  : 3/8/1934   MRN: [id number]      Dear Colleague,    Thank you for referring your patient, Kiara Pacheco, to the [facility] EPILEPSY CARE at [facility]. Please see a copy of my visit note below.    [facility] Epilepsy Care Progress Note      Patient:  Kiara Pacheco  :  3/8/1934   Age:  89 year old   Today's Office Visit:  3/8/2023    Epilepsy Data:    History of Present Illness:     89 year old with Parkinson's disease and dementia. Had been treated with high dose carbidopa levodopa. Former patient of Guille Powers.    Seen once by me Oct 2020. Daughter, a Russian trained neurologist felt that patient had seizures. At that time daughter described periods of collapse with hypotension associated with peak levodopa effect. Williamdanny reported monthly spells at that visit. EEG at that time showed mild slowing of background. We did not feel that her spells were epileptic but daughter persuaded us to trial levetiracetam at low doses.    Patient followed up with Dr Ulloa whom she saw once. At that time reported that levetiracetam had helped reduce the spells of collapse. Reported spells of collapse decreased from multiple per week to once per week so levetiracetam was increased to 500 mg twice a day. PD meds have since been refilled by Dr Ulloa. Daughter now presents desiring refill of sinemet and ropinrole. Daughter also desires trial of amantadine.    Daughter reports that she still gets dyskinesias at peak of dose and tremors at other times. She no longer collapses. Daughter concluded that levetiracetam was not helpful and stopped these medications.  [facility] EEG with moderate diffuse encephalopathy and MRI was unremarkable at that time. There have been no further episodes of collapse or hypotension.    Daughter reports that function about  the same. She can walk short distances with assistance but requires wheel chair for longer distances. Needs help for feeding and dressing. Difficulty swallowing and on largely thick liquid diet. No aspiration or pneumonias. Patient tolerated COVID in family better than some family members. Ariel reports some peak dyskinesias but better than previously on new sinement regimen. She has not followed with a PD specialist and is not particularly interested in doing so. Dementia no worse. Occasional hallucinations and occasionally hypersexual.     Current Outpatient Medications   Medication Sig Dispense Refill     acetaminophen (TYLENOL) 500 MG tablet Take 2 tablets by mouth every 6 hours as needed       alendronate (FOSAMAX) 40 MG TABS        ALENDRONATE SODIUM PO Take 40 mg by mouth once a week       amantadine (SYMMETREL) 100 MG capsule Take 1 capsule (100 mg) by mouth 2 times daily Take take tablet every morning 31 capsule 5     ARIPiprazole (ABILIFY) 5 MG tablet Take 2.5 mg by mouth       aspirin 81 MG tablet Take 1 tablet by mouth daily       calcium carbonate 600 mg-vitamin D 400 units 600-400 MG-UNIT per tablet Take 1 tablet by mouth 2 times daily       carbidopa-levodopa (SINEMET)  MG tablet Take 1 tablet by mouth See Admin Instructions Take one tablet six times per day. 186 tablet 8     docusate sodium (COLACE) 100 MG capsule Take 1 capsule by mouth daily       isosorbide mononitrate (IMDUR) 30 MG 24 hr tablet Take 2 tablets (60 mg) by mouth At Bedtime       levETIRAcetam (KEPPRA) 500 MG tablet Take 1 tablet (500 mg) by mouth 2 times daily Take one pill twice daily. 60 tablet 3     loratadine (CLARITIN REDITABS) 10 MG ODT Take 1 tablet (10 mg) by mouth as needed for allergies (itching) 10 tablet 0     nitroGLYcerin (NITROSTAT) 0.4 MG sublingual tablet Place 1 tablet under the tongue every 5 minutes as needed       ranitidine (ZANTAC) 150 MG tablet Take 1 tablet by mouth 3 times daily One tab in AM, 0.5  "tab midday, one pill in PM.        rOPINIRole (REQUIP) 0.25 MG tablet TAKE TWO TABLETS BY MOUTH THREE TIMES DAILY 180 tablet 5     rosuvastatin (CRESTOR) 10 MG tablet Take 10 mg by mouth       rosuvastatin (CRESTOR) 10 MG tablet Take 1 tablet by mouth At Bedtime       senna-docusate (SENOKOT-S/PERICOLACE) 8.6-50 MG tablet Take 1 tablet by mouth       senna-docusate (SENOKOT-S/PERICOLACE) 8.6-50 MG tablet Take 1 tablet by mouth 2 times daily       valACYclovir (VALTREX) 1000 mg tablet Take 1 tablet (1,000 mg) by mouth 3 times daily for 7 days 21 tablet 0        Medication Notes:  Currently taking sinemet 25/250, one tablet six times daily. ropinrole 0.5 mg tid.    Review of Systems:  No shortness of breath or cough.  Some dysphagia.   Have you experienced a traumatic fall since your last visit: NO    Exam:    /72   Pulse 114   Temp 97.5  F (36.4  C) (Temporal)   Ht 4' 9\" (144.8 cm)   BMI 27.05 kg/m       Wt Readings from Last 5 Encounters:   11/18/20 125 lb (56.7 kg)   01/27/20 135 lb (61.2 kg)   08/19/19 135 lb 12.8 oz (61.6 kg)   04/13/15 142 lb 3.2 oz (64.5 kg)   01/12/15 132 lb 4.4 oz (60 kg)     Alert. Decreased vision. Follows simple commands in Bah[place]ian. Exchanges simple social pleasantries with physician.  Able to get out of chair with assist, walks five feet to door, turns around, walks back to chair, sits down. Small steps. Turns en block. Unsafe on her own.  Seen 2 hours after dose. No tremor. Tone is mildly increased without cogwheeling. There is no drift or pronation. Rapid fine movements are slowed.  Deep tendon reflexes difficult to obtain.    IMPRESSION  1) Parkinson's Disease. Appears stable compared to my single exam several years ago. Still getting dyskinesias at peak of dose.  2) No evidence of epilepsy. Collapse probably related to hypotension related to peak of dose effects as we suggested. Moving to more frequent levodopa administration probably reduced peak of dose hypotensive effects " and stopped collapses.   3) Cognitive dysfunction, probable dementia; difficult to assess due to language.    DISCUSSION  Discussed with daughter that effects of adding amantadine at this point will likely be modest. There is possibilty of significant side effects including worsening hallucinations, edema, orthostatic hallucinations and livdeo reticularis (described as violaceous skin rash, often on legs). Daughter felt that this should be tried. We further discussed that we are not experts in this field and urged her to follow up with general neurology or the movement disorders group.    PLAN:  1) Continue current carbidopa/levodopa at six times per day. Continue ropinrole at current dose. Refilled for six months.  2) Asked to follow up with General Neurology, Dr Ulloa. She agreed to do so.    Total time on day of visit 25 minutes. Additional 6 minutes reviewing chart prior to visit. Total 31 minutes on day of visit. Note written later.    Maximo Stanley MD

## 2023-03-08 NOTE — TELEPHONE ENCOUNTER
What is the concern that needs to be addressed by a nurse? Patient pharmacy would like a call back to clarify the SIG on the amantadine (SYMMETREL) 100 MG capsule,Take 1 capsule (100 mg) by mouth 2 times daily Take take tablet every morning - Oral    May a detailed message be left on voicemail? No    Date of last office visit: 03/08/2023    Message routed to: [facility] RN POOL